# Patient Record
Sex: FEMALE | Race: OTHER | HISPANIC OR LATINO | URBAN - METROPOLITAN AREA
[De-identification: names, ages, dates, MRNs, and addresses within clinical notes are randomized per-mention and may not be internally consistent; named-entity substitution may affect disease eponyms.]

---

## 2022-07-12 ENCOUNTER — EMERGENCY (EMERGENCY)
Facility: HOSPITAL | Age: 62
LOS: 1 days | Discharge: HOME | End: 2022-07-12
Attending: STUDENT IN AN ORGANIZED HEALTH CARE EDUCATION/TRAINING PROGRAM
Payer: COMMERCIAL

## 2022-07-12 VITALS
SYSTOLIC BLOOD PRESSURE: 137 MMHG | DIASTOLIC BLOOD PRESSURE: 79 MMHG | HEART RATE: 85 BPM | OXYGEN SATURATION: 98 % | RESPIRATION RATE: 20 BRPM | HEIGHT: 59 IN | WEIGHT: 233.91 LBS | TEMPERATURE: 98 F

## 2022-07-12 PROCEDURE — 93010 ELECTROCARDIOGRAM REPORT: CPT | Mod: NC

## 2022-07-12 PROCEDURE — 99284 EMERGENCY DEPT VISIT MOD MDM: CPT | Mod: 25

## 2022-07-13 VITALS
SYSTOLIC BLOOD PRESSURE: 143 MMHG | OXYGEN SATURATION: 100 % | HEART RATE: 59 BPM | TEMPERATURE: 98 F | RESPIRATION RATE: 18 BRPM | DIASTOLIC BLOOD PRESSURE: 83 MMHG

## 2022-07-13 LAB
ALBUMIN SERPL ELPH-MCNC: 3.8 G/DL — SIGNIFICANT CHANGE UP (ref 3.3–5)
ALP SERPL-CCNC: 80 U/L — SIGNIFICANT CHANGE UP (ref 40–120)
ALT FLD-CCNC: 13 U/L — SIGNIFICANT CHANGE UP (ref 10–45)
ANION GAP SERPL CALC-SCNC: 13 MMOL/L — SIGNIFICANT CHANGE UP (ref 5–17)
AST SERPL-CCNC: 31 U/L — SIGNIFICANT CHANGE UP (ref 10–40)
BASOPHILS # BLD AUTO: 0 K/UL — SIGNIFICANT CHANGE UP (ref 0–0.2)
BASOPHILS NFR BLD AUTO: 0 % — SIGNIFICANT CHANGE UP (ref 0–2)
BILIRUB SERPL-MCNC: 0.3 MG/DL — SIGNIFICANT CHANGE UP (ref 0.2–1.2)
BUN SERPL-MCNC: 17 MG/DL — SIGNIFICANT CHANGE UP (ref 7–23)
CALCIUM SERPL-MCNC: 9 MG/DL — SIGNIFICANT CHANGE UP (ref 8.4–10.5)
CHLORIDE SERPL-SCNC: 104 MMOL/L — SIGNIFICANT CHANGE UP (ref 96–108)
CO2 SERPL-SCNC: 21 MMOL/L — LOW (ref 22–31)
CREAT SERPL-MCNC: 0.56 MG/DL — SIGNIFICANT CHANGE UP (ref 0.5–1.3)
EGFR: 102 ML/MIN/1.73M2 — SIGNIFICANT CHANGE UP
EOSINOPHIL # BLD AUTO: 0.22 K/UL — SIGNIFICANT CHANGE UP (ref 0–0.5)
EOSINOPHIL NFR BLD AUTO: 2.6 % — SIGNIFICANT CHANGE UP (ref 0–6)
GIANT PLATELETS BLD QL SMEAR: PRESENT — SIGNIFICANT CHANGE UP
GLUCOSE SERPL-MCNC: 93 MG/DL — SIGNIFICANT CHANGE UP (ref 70–99)
HCT VFR BLD CALC: 41.7 % — SIGNIFICANT CHANGE UP (ref 34.5–45)
HGB BLD-MCNC: 13 G/DL — SIGNIFICANT CHANGE UP (ref 11.5–15.5)
LYMPHOCYTES # BLD AUTO: 1.91 K/UL — SIGNIFICANT CHANGE UP (ref 1–3.3)
LYMPHOCYTES # BLD AUTO: 22.4 % — SIGNIFICANT CHANGE UP (ref 13–44)
MANUAL SMEAR VERIFICATION: SIGNIFICANT CHANGE UP
MCHC RBC-ENTMCNC: 22.3 PG — LOW (ref 27–34)
MCHC RBC-ENTMCNC: 31.2 GM/DL — LOW (ref 32–36)
MCV RBC AUTO: 71.5 FL — LOW (ref 80–100)
MONOCYTES # BLD AUTO: 0.73 K/UL — SIGNIFICANT CHANGE UP (ref 0–0.9)
MONOCYTES NFR BLD AUTO: 8.6 % — SIGNIFICANT CHANGE UP (ref 2–14)
NEUTROPHILS # BLD AUTO: 5.58 K/UL — SIGNIFICANT CHANGE UP (ref 1.8–7.4)
NEUTROPHILS NFR BLD AUTO: 64.6 % — SIGNIFICANT CHANGE UP (ref 43–77)
NEUTS BAND # BLD: 0.9 % — SIGNIFICANT CHANGE UP (ref 0–8)
PLAT MORPH BLD: NORMAL — SIGNIFICANT CHANGE UP
PLATELET # BLD AUTO: 256 K/UL — SIGNIFICANT CHANGE UP (ref 150–400)
POIKILOCYTOSIS BLD QL AUTO: SLIGHT — SIGNIFICANT CHANGE UP
POTASSIUM SERPL-MCNC: 5.3 MMOL/L — SIGNIFICANT CHANGE UP (ref 3.5–5.3)
POTASSIUM SERPL-SCNC: 5.3 MMOL/L — SIGNIFICANT CHANGE UP (ref 3.5–5.3)
PROT SERPL-MCNC: 8.1 G/DL — SIGNIFICANT CHANGE UP (ref 6–8.3)
RBC # BLD: 5.83 M/UL — HIGH (ref 3.8–5.2)
RBC # FLD: 16.1 % — HIGH (ref 10.3–14.5)
RBC BLD AUTO: SIGNIFICANT CHANGE UP
SARS-COV-2 RNA SPEC QL NAA+PROBE: SIGNIFICANT CHANGE UP
SODIUM SERPL-SCNC: 138 MMOL/L — SIGNIFICANT CHANGE UP (ref 135–145)
TROPONIN T, HIGH SENSITIVITY RESULT: <6 NG/L — SIGNIFICANT CHANGE UP (ref 0–51)
VARIANT LYMPHS # BLD: 0.9 % — SIGNIFICANT CHANGE UP (ref 0–6)
WBC # BLD: 8.52 K/UL — SIGNIFICANT CHANGE UP (ref 3.8–10.5)
WBC # FLD AUTO: 8.52 K/UL — SIGNIFICANT CHANGE UP (ref 3.8–10.5)

## 2022-07-13 PROCEDURE — 71045 X-RAY EXAM CHEST 1 VIEW: CPT | Mod: 26

## 2022-07-13 PROCEDURE — 80053 COMPREHEN METABOLIC PANEL: CPT

## 2022-07-13 PROCEDURE — 94640 AIRWAY INHALATION TREATMENT: CPT

## 2022-07-13 PROCEDURE — U0003: CPT

## 2022-07-13 PROCEDURE — 85025 COMPLETE CBC W/AUTO DIFF WBC: CPT

## 2022-07-13 PROCEDURE — U0005: CPT

## 2022-07-13 PROCEDURE — 84484 ASSAY OF TROPONIN QUANT: CPT

## 2022-07-13 PROCEDURE — 99285 EMERGENCY DEPT VISIT HI MDM: CPT | Mod: 25

## 2022-07-13 PROCEDURE — 71045 X-RAY EXAM CHEST 1 VIEW: CPT

## 2022-07-13 PROCEDURE — 93005 ELECTROCARDIOGRAM TRACING: CPT

## 2022-07-13 RX ORDER — IPRATROPIUM/ALBUTEROL SULFATE 18-103MCG
3 AEROSOL WITH ADAPTER (GRAM) INHALATION ONCE
Refills: 0 | Status: COMPLETED | OUTPATIENT
Start: 2022-07-13 | End: 2022-07-13

## 2022-07-13 RX ADMIN — Medication 3 MILLILITER(S): at 05:04

## 2022-07-13 NOTE — ED PROVIDER NOTE - NSFOLLOWUPCLINICS_GEN_ALL_ED_FT
[facility] Pulmonolgy and Sleep Medicine  Pulmonology  [address], [address]  [place] [address]  Phone: [phone]  Fax:     [facility] - Primary Care  Primary Care  [address] [place] [address]  Phone: [phone]  Fax:

## 2022-07-13 NOTE — ED PROVIDER NOTE - ATTENDING APP SHARED VISIT CONTRIBUTION OF CARE
I, Emma Murguia, performed a history and physical exam of the patient and discussed their management with the resident and /or advanced care provider. I reviewed the resident and /or ACP's note and agree with the documented findings and plan of care. I was present and available for all procedures.     pt with Hx of asthma, as needed albuterol inhaler at home, presenting with dry cough and SOB x 2-3 days. using inhaler at home without significant relief. reports intermittent severity of symptoms. no difficulty speaking, n/v. + chest tightness. speaking in full sentences, saturating well on RA, lungs CTAB, with dry cough on exam. likely viral infection causing mild asthma exacerbation. will give duonebs, labs, ekg and reassess.

## 2022-07-13 NOTE — ED ADULT NURSE NOTE - OBJECTIVE STATEMENT
62 y/o female with PMH of asthma arrives to the ER ambulatory  complaining of SOB. Pt is A&Ox4, speaking coherently,  pt reports worsening shortness of breath since Sunday, BROOKS and chest tightness today. On assessment airway is patent, breathing spontaneously and unlabored. Skin is dry, warm and color appropriate to race.  Abdomen is soft, no distended, no tender. Full ROM in all extremities.  Patient undressed and placed into gown, side rails up with bed locked and in lowest position for safety. call bell within reach. [place] provided. Comfort and safety provided. will continue to reassess. 60 y/o female with PMH of asthma arrives to the ER ambulatory complaining of SOB. Pt is A&Ox4, speaking coherently,  pt reports worsening shortness of breath, dry cough since Sunday, BROOKS and chest tightness today with associated nausea. EKG performed, blood work sent to the lab. On assessment airway is patent, breathing spontaneously and unlabored, able to speak full sentences Skin is dry, warm and color appropriate to race. Abdomen is soft, no distended, no tender. Full ROM in all extremities. Comfort and safety provided, side rails up with bed locked and in lowest position for safety. call bell within reach. [place] provided.  will continue to reassess.

## 2022-07-13 NOTE — ED PROVIDER NOTE - PATIENT PORTAL LINK FT
You can access the FollowMyHealth Patient Portal offered by [facility] by registering at the following website: [email or web address]. By joining [employer]’s FollowMyHealth portal, you will also be able to view your health information using other applications (apps) compatible with our system.

## 2022-07-13 NOTE — ED PROVIDER NOTE - PROGRESS NOTE DETAILS
labs and imaging reviewed with patient  patient reports improvement of symptoms  symptoms likely due to mild asthma exacerbation   will send prednisone to pharmacy   advised f/u with PCP   strict return precautions discussed   stable for discharge  discussed with Dr. Mcguire

## 2022-07-13 NOTE — ED PROVIDER NOTE - NS ED ATTENDING STATEMENT MOD
This was a shared visit with the LICHA. I reviewed and verified the documentation and independently performed the documented:

## 2022-07-13 NOTE — ED PROVIDER NOTE - OBJECTIVE STATEMENT
60 y/o female, hx of asthma, presents to the ER with complaint of cough, chest tightness and SOB x few days. states her cough is dry. states has an inhaler at home that she used but did not help. denies difficulty speaking. denies f/n/v/d, CP, LOC, numbness, tingling, dizziness, HA, abdominal pain, urinary symptoms.

## 2022-07-13 NOTE — ED PROVIDER NOTE - NSFOLLOWUPINSTRUCTIONS_ED_ALL_ED_FT
you were seen in the ER today for cough and Shortness of breath   your symptoms are likely due to your asthma acting up   it is important you follow up with your primary care doctor regarding today's visit    your medication from your pharmacy and take as instructed  if your symptoms worsen, persist, or if any new symptoms develop, or if you experience any signs of distress, return to the ER as soon as possible

## 2022-07-13 NOTE — ED PROVIDER NOTE - RESPIRATORY, MLM
Breath sounds clear and equal bilaterally. speaking in full sentences. no use of accessory muscles. no tripoding.

## 2023-07-15 ENCOUNTER — EMERGENCY (EMERGENCY)
Facility: HOSPITAL | Age: 63
LOS: 1 days | Discharge: HOME | End: 2023-07-15
Attending: EMERGENCY MEDICINE
Payer: COMMERCIAL

## 2023-07-15 VITALS
WEIGHT: 240.08 LBS | SYSTOLIC BLOOD PRESSURE: 120 MMHG | DIASTOLIC BLOOD PRESSURE: 52 MMHG | HEART RATE: 103 BPM | HEIGHT: 59 IN | TEMPERATURE: 98 F | RESPIRATION RATE: 18 BRPM | OXYGEN SATURATION: 95 %

## 2023-07-15 PROCEDURE — 99285 EMERGENCY DEPT VISIT HI MDM: CPT

## 2023-07-16 ENCOUNTER — INPATIENT (INPATIENT)
Facility: HOSPITAL | Age: 63
LOS: 2 days | Discharge: HOME | DRG: 193 | End: 2023-07-19
Attending: INTERNAL MEDICINE | Admitting: INTERNAL MEDICINE
Payer: COMMERCIAL

## 2023-07-16 VITALS
SYSTOLIC BLOOD PRESSURE: 126 MMHG | TEMPERATURE: 98 F | OXYGEN SATURATION: 96 % | DIASTOLIC BLOOD PRESSURE: 78 MMHG | RESPIRATION RATE: 18 BRPM | HEART RATE: 78 BPM

## 2023-07-16 VITALS
OXYGEN SATURATION: 95 % | WEIGHT: 240.08 LBS | HEIGHT: 59 IN | SYSTOLIC BLOOD PRESSURE: 143 MMHG | DIASTOLIC BLOOD PRESSURE: 83 MMHG | TEMPERATURE: 98 F | RESPIRATION RATE: 18 BRPM | HEART RATE: 96 BPM

## 2023-07-16 DIAGNOSIS — Z98.51 TUBAL LIGATION STATUS: Chronic | ICD-10-CM

## 2023-07-16 DIAGNOSIS — J96.01 ACUTE RESPIRATORY FAILURE WITH HYPOXIA: ICD-10-CM

## 2023-07-16 DIAGNOSIS — Z29.9 ENCOUNTER FOR PROPHYLACTIC MEASURES, UNSPECIFIED: ICD-10-CM

## 2023-07-16 DIAGNOSIS — R09.89 OTHER SPECIFIED SYMPTOMS AND SIGNS INVOLVING THE CIRCULATORY AND RESPIRATORY SYSTEMS: ICD-10-CM

## 2023-07-16 DIAGNOSIS — R73.9 HYPERGLYCEMIA, UNSPECIFIED: ICD-10-CM

## 2023-07-16 DIAGNOSIS — E66.01 MORBID (SEVERE) OBESITY DUE TO EXCESS CALORIES: ICD-10-CM

## 2023-07-16 DIAGNOSIS — R06.09 OTHER FORMS OF DYSPNEA: ICD-10-CM

## 2023-07-16 DIAGNOSIS — Z90.49 ACQUIRED ABSENCE OF OTHER SPECIFIED PARTS OF DIGESTIVE TRACT: Chronic | ICD-10-CM

## 2023-07-16 LAB
ALBUMIN SERPL ELPH-MCNC: 3.3 G/DL — LOW (ref 3.5–5)
ALBUMIN SERPL ELPH-MCNC: 3.4 G/DL — LOW (ref 3.5–5)
ALP SERPL-CCNC: 77 U/L — SIGNIFICANT CHANGE UP (ref 40–120)
ALP SERPL-CCNC: 77 U/L — SIGNIFICANT CHANGE UP (ref 40–120)
ALT FLD-CCNC: 22 U/L DA — SIGNIFICANT CHANGE UP (ref 10–60)
ALT FLD-CCNC: 23 U/L DA — SIGNIFICANT CHANGE UP (ref 10–60)
ANION GAP SERPL CALC-SCNC: 8 MMOL/L — SIGNIFICANT CHANGE UP (ref 5–17)
ANION GAP SERPL CALC-SCNC: 8 MMOL/L — SIGNIFICANT CHANGE UP (ref 5–17)
APTT BLD: 27.2 SEC — LOW (ref 27.5–35.5)
AST SERPL-CCNC: 13 U/L — SIGNIFICANT CHANGE UP (ref 10–40)
AST SERPL-CCNC: 9 U/L — LOW (ref 10–40)
BASE EXCESS BLDV CALC-SCNC: 3.9 MMOL/L — SIGNIFICANT CHANGE UP
BASOPHILS # BLD AUTO: 0.01 K/UL — SIGNIFICANT CHANGE UP (ref 0–0.2)
BASOPHILS # BLD AUTO: 0.03 K/UL — SIGNIFICANT CHANGE UP (ref 0–0.2)
BASOPHILS NFR BLD AUTO: 0.1 % — SIGNIFICANT CHANGE UP (ref 0–2)
BASOPHILS NFR BLD AUTO: 0.3 % — SIGNIFICANT CHANGE UP (ref 0–2)
BILIRUB SERPL-MCNC: 0.2 MG/DL — SIGNIFICANT CHANGE UP (ref 0.2–1.2)
BILIRUB SERPL-MCNC: 0.4 MG/DL — SIGNIFICANT CHANGE UP (ref 0.2–1.2)
BUN SERPL-MCNC: 14 MG/DL — SIGNIFICANT CHANGE UP (ref 7–18)
BUN SERPL-MCNC: 15 MG/DL — SIGNIFICANT CHANGE UP (ref 7–18)
CALCIUM SERPL-MCNC: 8.5 MG/DL — SIGNIFICANT CHANGE UP (ref 8.4–10.5)
CALCIUM SERPL-MCNC: 8.6 MG/DL — SIGNIFICANT CHANGE UP (ref 8.4–10.5)
CHLORIDE SERPL-SCNC: 111 MMOL/L — HIGH (ref 96–108)
CHLORIDE SERPL-SCNC: 112 MMOL/L — HIGH (ref 96–108)
CO2 SERPL-SCNC: 22 MMOL/L — SIGNIFICANT CHANGE UP (ref 22–31)
CO2 SERPL-SCNC: 23 MMOL/L — SIGNIFICANT CHANGE UP (ref 22–31)
CREAT SERPL-MCNC: 0.54 MG/DL — SIGNIFICANT CHANGE UP (ref 0.5–1.3)
CREAT SERPL-MCNC: 0.72 MG/DL — SIGNIFICANT CHANGE UP (ref 0.5–1.3)
EGFR: 103 ML/MIN/1.73M2 — SIGNIFICANT CHANGE UP
EGFR: 93 ML/MIN/1.73M2 — SIGNIFICANT CHANGE UP
EOSINOPHIL # BLD AUTO: 0 K/UL — SIGNIFICANT CHANGE UP (ref 0–0.5)
EOSINOPHIL # BLD AUTO: 0 K/UL — SIGNIFICANT CHANGE UP (ref 0–0.5)
EOSINOPHIL NFR BLD AUTO: 0 % — SIGNIFICANT CHANGE UP (ref 0–6)
EOSINOPHIL NFR BLD AUTO: 0 % — SIGNIFICANT CHANGE UP (ref 0–6)
GLUCOSE SERPL-MCNC: 113 MG/DL — HIGH (ref 70–99)
GLUCOSE SERPL-MCNC: 187 MG/DL — HIGH (ref 70–99)
HCO3 BLDV-SCNC: 28 MMOL/L — SIGNIFICANT CHANGE UP (ref 22–29)
HCT VFR BLD CALC: 38.6 % — SIGNIFICANT CHANGE UP (ref 34.5–45)
HCT VFR BLD CALC: 40.5 % — SIGNIFICANT CHANGE UP (ref 34.5–45)
HGB BLD-MCNC: 12.4 G/DL — SIGNIFICANT CHANGE UP (ref 11.5–15.5)
HGB BLD-MCNC: 12.8 G/DL — SIGNIFICANT CHANGE UP (ref 11.5–15.5)
IMM GRANULOCYTES NFR BLD AUTO: 0.8 % — SIGNIFICANT CHANGE UP (ref 0–0.9)
IMM GRANULOCYTES NFR BLD AUTO: 0.9 % — SIGNIFICANT CHANGE UP (ref 0–0.9)
INR BLD: 1.01 RATIO — SIGNIFICANT CHANGE UP (ref 0.88–1.16)
LYMPHOCYTES # BLD AUTO: 0.71 K/UL — LOW (ref 1–3.3)
LYMPHOCYTES # BLD AUTO: 0.82 K/UL — LOW (ref 1–3.3)
LYMPHOCYTES # BLD AUTO: 7.5 % — LOW (ref 13–44)
LYMPHOCYTES # BLD AUTO: 7.9 % — LOW (ref 13–44)
MCHC RBC-ENTMCNC: 22.6 PG — LOW (ref 27–34)
MCHC RBC-ENTMCNC: 23 PG — LOW (ref 27–34)
MCHC RBC-ENTMCNC: 31.6 GM/DL — LOW (ref 32–36)
MCHC RBC-ENTMCNC: 32.1 GM/DL — SIGNIFICANT CHANGE UP (ref 32–36)
MCV RBC AUTO: 71.4 FL — LOW (ref 80–100)
MCV RBC AUTO: 71.7 FL — LOW (ref 80–100)
MONOCYTES # BLD AUTO: 0.47 K/UL — SIGNIFICANT CHANGE UP (ref 0–0.9)
MONOCYTES # BLD AUTO: 0.61 K/UL — SIGNIFICANT CHANGE UP (ref 0–0.9)
MONOCYTES NFR BLD AUTO: 5 % — SIGNIFICANT CHANGE UP (ref 2–14)
MONOCYTES NFR BLD AUTO: 5.9 % — SIGNIFICANT CHANGE UP (ref 2–14)
NEUTROPHILS # BLD AUTO: 8.19 K/UL — HIGH (ref 1.8–7.4)
NEUTROPHILS # BLD AUTO: 8.81 K/UL — HIGH (ref 1.8–7.4)
NEUTROPHILS NFR BLD AUTO: 85.2 % — HIGH (ref 43–77)
NEUTROPHILS NFR BLD AUTO: 86.4 % — HIGH (ref 43–77)
NRBC # BLD: 0 /100 WBCS — SIGNIFICANT CHANGE UP (ref 0–0)
NRBC # BLD: 0 /100 WBCS — SIGNIFICANT CHANGE UP (ref 0–0)
NT-PROBNP SERPL-SCNC: 260 PG/ML — HIGH (ref 0–125)
PCO2 BLDV: 38 MMHG — LOW (ref 39–42)
PH BLDV: 7.47 — HIGH (ref 7.32–7.43)
PLATELET # BLD AUTO: 284 K/UL — SIGNIFICANT CHANGE UP (ref 150–400)
PLATELET # BLD AUTO: 285 K/UL — SIGNIFICANT CHANGE UP (ref 150–400)
PO2 BLDV: 49 MMHG — SIGNIFICANT CHANGE UP
POTASSIUM SERPL-MCNC: 4.1 MMOL/L — SIGNIFICANT CHANGE UP (ref 3.5–5.3)
POTASSIUM SERPL-MCNC: 4.5 MMOL/L — SIGNIFICANT CHANGE UP (ref 3.5–5.3)
POTASSIUM SERPL-SCNC: 4.1 MMOL/L — SIGNIFICANT CHANGE UP (ref 3.5–5.3)
POTASSIUM SERPL-SCNC: 4.5 MMOL/L — SIGNIFICANT CHANGE UP (ref 3.5–5.3)
PROT SERPL-MCNC: 7.7 G/DL — SIGNIFICANT CHANGE UP (ref 6–8.3)
PROT SERPL-MCNC: 8 G/DL — SIGNIFICANT CHANGE UP (ref 6–8.3)
PROTHROM AB SERPL-ACNC: 12 SEC — SIGNIFICANT CHANGE UP (ref 10.5–13.4)
RAPID RVP RESULT: DETECTED
RBC # BLD: 5.38 M/UL — HIGH (ref 3.8–5.2)
RBC # BLD: 5.67 M/UL — HIGH (ref 3.8–5.2)
RBC # FLD: 15.8 % — HIGH (ref 10.3–14.5)
RBC # FLD: 15.9 % — HIGH (ref 10.3–14.5)
RSV RNA SPEC QL NAA+PROBE: DETECTED
SAO2 % BLDV: 79 % — SIGNIFICANT CHANGE UP
SARS-COV-2 RNA SPEC QL NAA+PROBE: SIGNIFICANT CHANGE UP
SODIUM SERPL-SCNC: 142 MMOL/L — SIGNIFICANT CHANGE UP (ref 135–145)
SODIUM SERPL-SCNC: 142 MMOL/L — SIGNIFICANT CHANGE UP (ref 135–145)
TROPONIN I, HIGH SENSITIVITY RESULT: 15.1 NG/L — SIGNIFICANT CHANGE UP
TROPONIN I, HIGH SENSITIVITY RESULT: 8.8 NG/L — SIGNIFICANT CHANGE UP
WBC # BLD: 10.34 K/UL — SIGNIFICANT CHANGE UP (ref 3.8–10.5)
WBC # BLD: 9.48 K/UL — SIGNIFICANT CHANGE UP (ref 3.8–10.5)
WBC # FLD AUTO: 10.34 K/UL — SIGNIFICANT CHANGE UP (ref 3.8–10.5)
WBC # FLD AUTO: 9.48 K/UL — SIGNIFICANT CHANGE UP (ref 3.8–10.5)

## 2023-07-16 PROCEDURE — 71045 X-RAY EXAM CHEST 1 VIEW: CPT | Mod: 26,59

## 2023-07-16 PROCEDURE — 99285 EMERGENCY DEPT VISIT HI MDM: CPT

## 2023-07-16 PROCEDURE — 94640 AIRWAY INHALATION TREATMENT: CPT

## 2023-07-16 PROCEDURE — 99285 EMERGENCY DEPT VISIT HI MDM: CPT | Mod: 25

## 2023-07-16 PROCEDURE — 96365 THER/PROPH/DIAG IV INF INIT: CPT

## 2023-07-16 PROCEDURE — 71046 X-RAY EXAM CHEST 2 VIEWS: CPT | Mod: 26

## 2023-07-16 PROCEDURE — 96375 TX/PRO/DX INJ NEW DRUG ADDON: CPT

## 2023-07-16 PROCEDURE — 71275 CT ANGIOGRAPHY CHEST: CPT | Mod: 26,MA

## 2023-07-16 RX ORDER — ENOXAPARIN SODIUM 100 MG/ML
40 INJECTION SUBCUTANEOUS EVERY 24 HOURS
Refills: 0 | Status: DISCONTINUED | OUTPATIENT
Start: 2023-07-16 | End: 2023-07-19

## 2023-07-16 RX ORDER — CEFTRIAXONE 500 MG/1
1000 INJECTION, POWDER, FOR SOLUTION INTRAMUSCULAR; INTRAVENOUS EVERY 24 HOURS
Refills: 0 | Status: DISCONTINUED | OUTPATIENT
Start: 2023-07-17 | End: 2023-07-19

## 2023-07-16 RX ORDER — MAGNESIUM SULFATE 500 MG/ML
2 VIAL (ML) INJECTION ONCE
Refills: 0 | Status: COMPLETED | OUTPATIENT
Start: 2023-07-16 | End: 2023-07-16

## 2023-07-16 RX ORDER — IPRATROPIUM/ALBUTEROL SULFATE 18-103MCG
3 AEROSOL WITH ADAPTER (GRAM) INHALATION
Refills: 0 | Status: COMPLETED | OUTPATIENT
Start: 2023-07-16 | End: 2023-07-16

## 2023-07-16 RX ORDER — AZITHROMYCIN 500 MG/1
500 TABLET, FILM COATED ORAL ONCE
Refills: 0 | Status: COMPLETED | OUTPATIENT
Start: 2023-07-16 | End: 2023-07-16

## 2023-07-16 RX ORDER — AZITHROMYCIN 500 MG/1
1 TABLET, FILM COATED ORAL
Qty: 6 | Refills: 0
Start: 2023-07-16 | End: 2023-07-20

## 2023-07-16 RX ORDER — CEFTRIAXONE 500 MG/1
1000 INJECTION, POWDER, FOR SOLUTION INTRAMUSCULAR; INTRAVENOUS ONCE
Refills: 0 | Status: COMPLETED | OUTPATIENT
Start: 2023-07-16 | End: 2023-07-16

## 2023-07-16 RX ORDER — CEFTRIAXONE 500 MG/1
INJECTION, POWDER, FOR SOLUTION INTRAMUSCULAR; INTRAVENOUS
Refills: 0 | Status: DISCONTINUED | OUTPATIENT
Start: 2023-07-16 | End: 2023-07-19

## 2023-07-16 RX ORDER — AZITHROMYCIN 500 MG/1
TABLET, FILM COATED ORAL
Refills: 0 | Status: DISCONTINUED | OUTPATIENT
Start: 2023-07-16 | End: 2023-07-19

## 2023-07-16 RX ORDER — AZITHROMYCIN 500 MG/1
500 TABLET, FILM COATED ORAL EVERY 24 HOURS
Refills: 0 | Status: DISCONTINUED | OUTPATIENT
Start: 2023-07-17 | End: 2023-07-19

## 2023-07-16 RX ORDER — IPRATROPIUM/ALBUTEROL SULFATE 18-103MCG
3 AEROSOL WITH ADAPTER (GRAM) INHALATION EVERY 6 HOURS
Refills: 0 | Status: DISCONTINUED | OUTPATIENT
Start: 2023-07-16 | End: 2023-07-19

## 2023-07-16 RX ADMIN — Medication 3 MILLILITER(S): at 23:15

## 2023-07-16 RX ADMIN — Medication 3 MILLILITER(S): at 00:49

## 2023-07-16 RX ADMIN — AZITHROMYCIN 255 MILLIGRAM(S): 500 TABLET, FILM COATED ORAL at 20:03

## 2023-07-16 RX ADMIN — Medication 2 GRAM(S): at 01:10

## 2023-07-16 RX ADMIN — Medication 3 MILLILITER(S): at 01:51

## 2023-07-16 RX ADMIN — Medication 3 MILLILITER(S): at 01:21

## 2023-07-16 RX ADMIN — Medication 125 MILLIGRAM(S): at 15:20

## 2023-07-16 RX ADMIN — Medication 150 GRAM(S): at 00:49

## 2023-07-16 RX ADMIN — Medication 125 MILLIGRAM(S): at 00:49

## 2023-07-16 RX ADMIN — CEFTRIAXONE 1000 MILLIGRAM(S): 500 INJECTION, POWDER, FOR SOLUTION INTRAMUSCULAR; INTRAVENOUS at 17:46

## 2023-07-16 NOTE — ED PROVIDER NOTE - CARE PLAN
1 Principal Discharge DX:	BROOKS (dyspnea on exertion)   Principal Discharge DX:	BROOKS (dyspnea on exertion)  Secondary Diagnosis:	Acute respiratory failure with hypoxia

## 2023-07-16 NOTE — ED PROVIDER NOTE - CLINICAL SUMMARY MEDICAL DECISION MAKING FREE TEXT BOX
Patient with shortness of breath worse with any exertion.  We will do labs x-ray nebulizer treatment admit for cardiac work-up. Patient with shortness of breath worse with any exertion.  We will do labs x-ray nebulizer treatment admit for cardiac work-up. O2 sat 92-91 on room air. will order CT angio to evaluation for PE. admit to tele for BROOKS as anginal equivalent

## 2023-07-16 NOTE — ED PROVIDER NOTE - OBJECTIVE STATEMENT
62-year-old female history of asthma presents to ED with shortness of breath for the past 2 days.  Patient went to an urgent care yesterday for same was given a nebulizer treatment prescription for steroids and discharged home.  As per patient she had a viral swab done which was negative.  Patient states she returned home however the cough and shortness of breath continued.  Cough nonproductive as per patient.

## 2023-07-16 NOTE — ED PROVIDER NOTE - OBJECTIVE STATEMENT
Patient presents with shortness of breath.  Patient's daughter notes that for the last several months has been having shortness of breath that is getting worse.  Shortness of breath becomes increasingly acute during attempted exertion.  Patient was in the ER 12 hours ago had x-rays and labs and was diagnosed with maybe mild pneumonia and was discharged home.  However patient returns still feeling very short of breath.  Patient does not really have a PCP has not really followed up so has no known medical problems.  Daughter notes that perhaps the ankles are more swollen than normal.  Notes occasional chest pain no chest pain currently.

## 2023-07-16 NOTE — H&P ADULT - ATTENDING COMMENTS
Pt appearing to have clinical s/s of CAP in addition to parameters for pickwickian syndrome making her breathing a task.  Pt is being closely monitored in case of CPAP support if need be.

## 2023-07-16 NOTE — H&P ADULT - NSHPPHYSICALEXAM_GEN_ALL_CORE
GENERAL: Mild resp distress  EYES: EOMI, PERRLA,   NECK: Supple, No JVD  CHEST/LUNG: Wheezing L>R  HEART: Regular rate and rhythm; No murmurs, +ve S1 S2   ABDOMEN: Soft, Nontender, distended;  NERVOUS SYSTEM:  Alert & Oriented X3, no focal neurologic deficit   EXTREMITIES:   No clubbing, cyanosis, +ve lymphedema B/L

## 2023-07-16 NOTE — ED ADULT NURSE REASSESSMENT NOTE - NS ED NURSE REASSESS COMMENT FT1
Pt received from KAYLEE Castelan, calm, no s/s of any distress noted. IV line in place, IV fluids infusing as per orders, no signs of infiltration noted. VS WNL. Call bell in reach, bed in lowest position. Pt on cardiac monitor, no distress noted.

## 2023-07-16 NOTE — ED PROVIDER NOTE - PROGRESS NOTE DETAILS
pt reassessed and reports feeling better.  pt has steroid prescription given by urgent care yesterday.  Will dc with abx for bronchitis

## 2023-07-16 NOTE — ED ADULT NURSE NOTE - NSFALLUNIVINTERV_ED_ALL_ED
Bed/Stretcher in lowest position, wheels locked, appropriate side rails in place/Call bell, personal items and telephone in reach/Instruct patient to call for assistance before getting out of bed/chair/stretcher/Non-slip footwear applied when patient is off stretcher/[place] to call system/Physically safe environment - no spills, clutter or unnecessary equipment/Purposeful proactive rounding/Room/bathroom lighting operational, light cord in reach

## 2023-07-16 NOTE — H&P ADULT - NSHPREVIEWOFSYSTEMS_GEN_ALL_CORE
REVIEW OF SYSTEMS      General:	    Respiratory and Thorax: denied , +ve SOB on exertion  	  Cardiovascular: chest pain on coughing , +ve SOB on exertion    Gastrointestinal: denied abdominal pain     Musculoskeletal: denied body aches    Neurological: denied headaches, blurry vision

## 2023-07-16 NOTE — ED PROVIDER NOTE - PROGRESS NOTE DETAILS
Patient had an episode of acute respiratory distress after returning from x-ray.  Patient states that when she had to get up for x-ray she became acutely short of breath and was brought back from x-ray gasping diaphoretic and a little bit cyanotic.  Patient was allowed to stand up from sitting because body habitus appeared to be impeding the respiratory ability.  Patient felt better when sitting up with the legs hanging down off the stretcher patient was placed on nonrebreather and then received nebulizer treatment.  O2 sat 100%.

## 2023-07-16 NOTE — H&P ADULT - PROBLEM SELECTOR PLAN 1
-Pt came with BROOKS and productive cough  -DDx: Viral/Bacterial pneumonia vs ?PE , any cardiac disease and obesity can be contributing factor   -Tropx2 neg , ProBNP <300 but could be falsely low in obese pt   -Xray chest with left lung atelectasis   -f/u CTA chest for PE  -VBG : PH 7.47/38  -Pt has no leukocytosis , afebrile, HR 96 on admission, but she was on abx recently   -Will start on Ceftriaxone and azithromycin to treat bacterial pneumonia  -Duoneb q6hr for wheezing  -ECHO and Tele monitor for 24 hours if no event and to rule out any arrhythmias -Pt came with BROOKS and productive cough  -DDx: Viral/Bacterial pneumonia vs ?PE , any cardiac disease and obesity can be contributing factor   -Tropx2 neg , ProBNP <300 but could be falsely low in obese pt   -Xray chest with left lung atelectasis   -f/u CTA chest for PE/or other pathology  -VBG : PH 7.47/38  -Pt has no leukocytosis , afebrile, HR 96 on admission, but she was on abx recently   -Will start on Ceftriaxone and azithromycin to treat bacterial pneumonia  -Duoneb q6hr for wheezing  -ECHO   -Tele monitor for 24 hours to rule out any arrhythmias if no event can dc tele  -Ordered sputum culture , f/u procalcitonin in am -Pt came with BROOKS and productive cough  -DDx: Viral/Bacterial pneumonia vs ?PE ,obesity can be a contributing factor   -Tropx2 neg , ProBNP <300, ekg with no ischemic changes or arrhythmias   -Xray chest with left lung atelectasis   -f/u CTA chest for PE/or other pathology  -VBG : PH 7.47/38  -Pt has no leukocytosis , afebrile, HR 96 on admission, but she was on abx recently   -Will start on Ceftriaxone and azithromycin to treat bacterial pneumonia  -Duoneb q6hr for wheezing  -Ordered sputum culture , f/u procalcitonin in am  -f/u RVP/COVID  -f/u bcx   -f/u UA -Pt came with BROOKS and productive cough  -Pt noted to be 90% on RA with tachypnea, placed on 2 lit with O2 saturation 96-07%  -DDx: Viral/Bacterial pneumonia vs ?PE ,obesity can be a contributing factor   -Tropx2 neg , ProBNP <300, ekg with no ischemic changes or arrhythmias   -Xray chest with left lung atelectasis   -f/u CTA chest for PE/or other pathology  -VBG : PH 7.47/38  -Pt has no leukocytosis , afebrile, HR 96 on admission, but she was on abx recently   -Will start on Ceftriaxone and azithromycin to treat bacterial pneumonia  -Duoneb q6hr for wheezing  -Ordered sputum culture , f/u procalcitonin in am  -f/u RVP/COVID  -f/u bcx   -f/u UA

## 2023-07-16 NOTE — ED ADULT NURSE NOTE - NS ED NURSE PATIENT LEFT UNIT TIME
0000- TRANSFER - IN REPORT:    Verbal report received from [facility], RN(name) on Brenda 60  being received from NICU(unit) for routine progression of care      Report consisted of patients Situation, Background, Assessment and   Recommendations(SBAR). Information from the following report(s) SBAR, Kardex, Intake/Output, MAR and Recent Results was reviewed with the receiving nurse. Opportunity for questions and clarification was provided. Assessment completed upon patients arrival to unit and care assumed. 0715- Bedside shift change report given to JASON Quiros RN (oncoming nurse) by She Murray RN (offgoing nurse). Report included the following information SBAR, Kardex, Intake/Output, MAR, Recent Results and Quality Measures. 17:50

## 2023-07-16 NOTE — PATIENT PROFILE ADULT - STATED REASON FOR ADMISSION
Provider response relayed via portal message. Awaiting reply from patient.     DIFFICULTIES BREATHING

## 2023-07-16 NOTE — ED PROVIDER NOTE - CLINICAL SUMMARY MEDICAL DECISION MAKING FREE TEXT BOX
62-year-old female history of asthma presents to ED with shortness of breath.  Here in ED patient with wheezing diffusely.  Speaking full sentences.  Likely asthma exacerbation.  Will check chest x-ray rule out pneumonia nebs steroids magnesium reassess

## 2023-07-16 NOTE — ED PROVIDER NOTE - NSFOLLOWUPINSTRUCTIONS_ED_ALL_ED_FT
Acute Bronchitis, Adult  A comparison between normal and swollen airways, or bronchi, in the lungs.  Acute bronchitis is sudden inflammation of the main airways (bronchi) that come off the windpipe (trachea) in the lungs. The swelling causes the airways to get smaller and make more mucus than normal. This can make it hard to breathe and can cause coughing or noisy breathing (wheezing).    Acute bronchitis may last several weeks. The cough may last longer. Allergies, asthma, and exposure to smoke may make the condition worse.    What are the causes?  This condition can be caused by germs and by substances that irritate the lungs, including:  Cold and flu viruses. The most common cause of this condition is the virus that causes the common cold.  Bacteria. This is less common.  Breathing in substances that irritate the lungs, including:  Smoke from cigarettes and other forms of tobacco.  Dust and pollen.  Fumes from household cleaning products, gases, or burned fuel.  Indoor or outdoor air pollution.  What increases the risk?  The following factors may make you more likely to develop this condition:  A weak body's defense system, also called the immune system.  A condition that affects your lungs and breathing, such as asthma.  What are the signs or symptoms?  Common symptoms of this condition include:  Coughing. This may bring up clear, yellow, or green mucus from your lungs (sputum).  Wheezing.  Runny or stuffy nose.  Having too much mucus in your lungs (chest congestion).  Shortness of breath.  Aches and pains, including sore throat or chest.  How is this diagnosed?  This condition is usually diagnosed based on:  Your symptoms and medical history.  A physical exam.  You may also have other tests, including tests to rule out other conditions, such as pneumonia. These tests include:  A test of lung function.  Test of a mucus sample to look for the presence of bacteria.  Tests to check the oxygen level in your blood.  Blood tests.  Chest X-ray.  How is this treated?  Most cases of acute bronchitis clear up over time without treatment. Your health care provider may recommend:  Drinking more fluids to help thin your mucus so it is easier to cough up.  Taking inhaled medicine (inhaler) to improve air flow in and out of your lungs.  Using a vaporizer or a humidifier. These are machines that add water to the air to help you breathe better.  Taking a medicine that thins mucus and clears congestion (expectorant).  Taking a medicine that prevents or stops coughing (cough suppressant).  It is not common to take an antibiotic medicine for this condition.    Follow these instructions at home:  A do not smoke cigarettes sign.  Take over-the-counter and prescription medicines only as told by your health care provider.  Use an inhaler, vaporizer, or humidifier as told by your health care provider.  Take two teaspoons (10 mL) of honey at bedtime to lessen coughing at night.  Drink enough fluid to keep your urine pale yellow.  Do not use any products that contain nicotine or tobacco. These products include cigarettes, chewing tobacco, and vaping devices, such as e-cigarettes. If you need help quitting, ask your health care provider.  Get plenty of rest.  Return to your normal activities as told by your health care provider. Ask your health care provider what activities are safe for you.  Keep all follow-up visits. This is important.  How is this prevented?  Washing hands with soap and water.  To lower your risk of getting this condition again:  Wash your hands often with soap and water for at least 20 seconds. If soap and water are not available, use hand .  Avoid contact with people who have cold symptoms.  Try not to touch your mouth, nose, or eyes with your hands.  Avoid breathing in smoke or chemical fumes. Breathing smoke or chemical fumes will make your condition worse.  Get the flu shot every year.  Contact a health care provider if:  Your symptoms do not improve after 2 weeks.  You have trouble coughing up the mucus.  Your cough keeps you awake at night.  You have a fever.  Get help right away if you:  Cough up blood.  Feel pain in your chest.  Have severe shortness of breath.  Faint or keep feeling like you are going to faint.  Have a severe headache.  Have a fever or chills that get worse.  These symptoms may represent a serious problem that is an emergency. Do not wait to see if the symptoms will go away. Get medical help right away. Call your local emergency services (911 in the [place] Do not drive yourself to the hospital.    Summary  Acute bronchitis is inflammation of the main airways (bronchi) that come off the windpipe (trachea) in the lungs. The swelling causes the airways to get smaller and make more mucus than normal.  Drinking more fluids can help thin your mucus so it is easier to cough up.  Take over-the-counter and prescription medicines only as told by your health care provider.  Do not use any products that contain nicotine or tobacco. These products include cigarettes, chewing tobacco, and vaping devices, such as e-cigarettes. If you need help quitting, ask your health care provider.  Contact a health care provider if your symptoms do not improve after 2 weeks.  This information is not intended to replace advice given to you by your health care provider. Make sure you discuss any questions you have with your health care provider.    Document Revised: 03/30/2023 Document Reviewed: 04/20/2022

## 2023-07-16 NOTE — ED PROVIDER NOTE - PATIENT PORTAL LINK FT
You can access the FollowMyHealth Patient Portal offered by [facility] by registering at the following website: [email or web address]. By joining [employer]’s FollowMyHealth portal, you will also be able to view your health information using other applications (apps) compatible with our system.

## 2023-07-16 NOTE — ED PROVIDER NOTE - PHYSICAL EXAMINATION
Heart regular trace expiratory wheeze.  Patient's body habitus limits quality physical exam.  Awake alert not in any distress.  Nonpitting edema in lower legs.

## 2023-07-16 NOTE — PATIENT PROFILE ADULT - FALL HARM RISK - HARM RISK INTERVENTIONS

## 2023-07-16 NOTE — ED ADULT NURSE NOTE - OBJECTIVE STATEMENT
pt awake and oriented x 4. c/o sob. pt known asthma. pt dyspnea when moving. pt sitting on the bed. ambulatory

## 2023-07-16 NOTE — H&P ADULT - HISTORY OF PRESENT ILLNESS
61 yo F, from home, ambulates independently, no known PMH/PSH, has not seen doctor in years, came with chief complain of cough and shortness of breadth. Pt stated that she has been coughing for 2 months first it was dry but for 2 days she is bringing up mucus white in color. Pt has had BROOKS for two months which she was able to manage but for two days it is getting worse and she can't even walk few steps without getting short of breath. Pt has been to urgent care multiple time in past two months and was prescribed prednisione and albuterol. As her symptoms got worse in two days she came to [facility] ED this morning and was discharged on advil, azithromycina dn prednisone but pt said as soon as she got home she was so SOB that unable to do any activity which prompted her to come to ED. Pt deneid sick contacts, chest pain +ve only when she ocughs, denied hemoptysis, orthopnea, dyspnea at rest.     ED: s/p IV solumedrol  Xray chest showed Present left shows slight linear atelectasis in left mid lateral lung  CTA showed

## 2023-07-16 NOTE — H&P ADULT - ASSESSMENT
63 yo F, from home, ambulates independently, no known PMH/PSH, has not seen doctor in years, came with chief complain of cough and shortness of breadth started two months ago and got worsened two days ago. Admitted for AHRF in the setting of viral/bacterial pneumonia vs PE

## 2023-07-16 NOTE — ED ADULT NURSE NOTE - NSFALLUNIVINTERV_ED_ALL_ED
Bed/Stretcher in lowest position, wheels locked, appropriate side rails in place/Call bell, personal items and telephone in reach/Instruct patient to call for assistance before getting out of bed/chair/stretcher/Non-slip footwear applied when patient is off stretcher/[place] to call system/Physically safe environment - no spills, clutter or unnecessary equipment/Purposeful proactive rounding/Room/bathroom lighting operational, light cord in reach

## 2023-07-16 NOTE — ED PROVIDER NOTE - IV ALTEPLASE EXCL REL HIDDEN
"SUBJECTIVE:  This is a 32year old female patient presents for 2 month follow up regarding change in [facility] to POP due to migraines with aura. Pt has noticed a decrease in migraines from 2-3 every 2 weeks to 1 in the past 8 weeks. Doing okay with POP no menses, taking daily. Pt has long standing sexual dysfunction, unable to have coitus. Reviewed anatomy last visit, pt has been working on intimacy with . Looked at some books on line and thinks she is going to order the [employer]. Has not pursued genital finger or tampon use. Declines pap smear or pelvic exam today. Has been able to have finger stimulation from  but this was few years ago. Denies hx of past abuse. Current Outpatient Medications   Medication Sig Dispense Refill   â¢ norethindrone (MICRONOR) 0.35 MG tablet Take 1 tablet by mouth daily. Take one tablet by mouth daily. 3 packet 3   â¢ buPROPion (WELLBUTRIN) 75 MG tablet Take 1 tablet by mouth 2 times daily. Dose decreased 12/22/18 180 tablet 0   â¢ amitriptyline (ELAVIL) 10 MG tablet TAKE TWO TABLETS BY MOUTH DAILY AT BEDTIME 180 tablet 3   â¢ sumatriptan (IMITREX) 100 MG tablet TAKE ONE TABLET BY MOUTH AT ONSET OF MIGRAINE HEADACHE. MAXIMUM  MG PER DAY 9 tablet 5     No current facility-administered medications for this visit. ALLERGIES:  No Known Allergies    I have reviewed and updated today the pt's past medical hx, surgical hx, Family Hx, Social Hx  in the electronic medical record. OBJECTIVE:  Visit Vitals  /68 [place] [facility] [place]: Sitting, Cuff Size: Regular)   Ht 5' 11"" (1.803 m)   Wt 83.6 kg   LMP 12/16/2018 (Exact Date)   BMI 25.72 kg/mÂ²       Time spent in consultation over 50% of the visit. Again reviewed genital anatomy, pt is willing to try manual exploration/stimulation and possible tampon use for short time. Pt did feel she would benefit from counseling and was given referral to Renetta Marie, [address] [place] [address].  She feels her thoughts and need " for control are preventing her from moving forward.  is very supportive. Pt overall goal is to have a healthy sex life and be able to conceive. Reviewed after the above steps to consider using low dose xanax for few times with penetrative coitus or vaginal valium suppository, possibly female PT. Pt will contact me when she is ready for the next step. Erik Jean-Paul was seen today for follow-up. Diagnoses and all orders for this visit:    Encounter for surveillance of contraceptive pills  -     norethindrone (MICRONOR) 0.35 MG tablet; Take 1 tablet by mouth daily. Take one tablet by mouth daily. Migraine with aura and without status migrainosus, not intractable    Vaginismus          See above note.   Pt to follow up prn and one year show

## 2023-07-16 NOTE — H&P ADULT - NSICDXPASTSURGICALHX_GEN_ALL_CORE_FT
PAST SURGICAL HISTORY:  History of bilateral ligation of fallopian tubes     S/P cholecystectomy

## 2023-07-17 RX ORDER — ACETAMINOPHEN 500 MG
650 TABLET ORAL EVERY 6 HOURS
Refills: 0 | Status: DISCONTINUED | OUTPATIENT
Start: 2023-07-17 | End: 2023-07-19

## 2023-07-17 RX ADMIN — Medication 3 MILLILITER(S): at 08:15

## 2023-07-17 RX ADMIN — Medication 3 MILLILITER(S): at 14:54

## 2023-07-17 RX ADMIN — ENOXAPARIN SODIUM 40 MILLIGRAM(S): 100 INJECTION SUBCUTANEOUS at 06:46

## 2023-07-17 RX ADMIN — CEFTRIAXONE 100 MILLIGRAM(S): 500 INJECTION, POWDER, FOR SOLUTION INTRAMUSCULAR; INTRAVENOUS at 16:22

## 2023-07-17 RX ADMIN — Medication 200 MILLIGRAM(S): at 21:09

## 2023-07-17 RX ADMIN — Medication 3 MILLILITER(S): at 20:32

## 2023-07-17 RX ADMIN — Medication 3 MILLILITER(S): at 02:24

## 2023-07-17 RX ADMIN — AZITHROMYCIN 255 MILLIGRAM(S): 500 TABLET, FILM COATED ORAL at 16:22

## 2023-07-17 NOTE — CONSULT NOTE ADULT - SUBJECTIVE AND OBJECTIVE BOX
Time of visit:    CHIEF COMPLAINT: Patient is a 62y old  Female who presents with a chief complaint of AHRF in the setting of pneumonia vs PE (17 Jul 2023 13:04)      HPI:  63 yo F, from home, ambulates independently, no known PMH/PSH, has not seen doctor in years, came with chief complain of cough and shortness of breadth. Pt stated that she has been coughing for 2 months first it was dry but for 2 days she is bringing up mucus white in color. Pt has had BROOKS for two months which she was able to manage but for two days it is getting worse and she can't even walk few steps without getting short of breath. Pt has been to urgent care multiple time in past two months and was prescribed prednisione and albuterol. As her symptoms got worse in two days she came to [facility] ED this morning and was discharged on advil, azithromycina dn prednisone but pt said as soon as she got home she was so SOB that unable to do any activity which prompted her to come to ED. Pt deneid sick contacts, chest pain +ve only when she ocughs, denied hemoptysis, orthopnea, dyspnea at rest.     ED: s/p IV solumedrol  Xray chest showed Present left shows slight linear atelectasis in left mid lateral lung  CTA showed   (16 Jul 2023 16:41)   Patient seen and examined.     PAST MEDICAL & SURGICAL HISTORY:  S/P cholecystectomy      History of bilateral ligation of fallopian tubes          Allergies    No Known Allergies    Intolerances        MEDICATIONS  (STANDING):  albuterol/ipratropium for Nebulization 3 milliLiter(s) Nebulizer every 6 hours  azithromycin  IVPB      azithromycin  IVPB 500 milliGRAM(s) IV Intermittent every 24 hours  cefTRIAXone   IVPB 1000 milliGRAM(s) IV Intermittent every 24 hours  cefTRIAXone   IVPB      enoxaparin Injectable 40 milliGRAM(s) SubCutaneous every 24 hours      MEDICATIONS  (PRN):  acetaminophen     Tablet .. 650 milliGRAM(s) Oral every 6 hours PRN Temp greater or equal to 38C (100.4F), Moderate Pain (4 - 6)  guaiFENesin Oral Liquid (Sugar-Free) 200 milliGRAM(s) Oral every 6 hours PRN Cough   Medications up to date at time of exam.    Medications up to date at time of exam.    FAMILY HISTORY:  FH: heart disease (Mother)        SOCIAL HISTORY  Smoking History: Denies active smoking. Had smoke for short term when she was in 20s.  Living Condition: [   ] apartment, [   ] private house  Work History:   Travel History: denies recent travel  Illicit Substance Use: denies  Alcohol Use: denies    REVIEW OF SYSTEMS:    CONSTITUTIONAL:  No fevers, chills on exam.     HEENT:  Denies blurred vision. No sore throat nor runny nose.    CARDIOVASCULAR:  Denies chest discomfort. No palpitations.    RESPIRATORY:  Verbalized of  SOB on exertion. Has non productive  cough with clear phlegm .    GASTROINTESTINAL:  Denies abdominal pain. No vomiting nor diarrhea on exam .    GENITOURINARY: Denies dysuria.    NEUROLOGIC:  No tremors, nor seizures on exam.     PSYCHIATRIC:  No emotional distress on exam.     PHYSICAL EXAMINATION:    GENERAL: Alert and oriented. Morbid Obese. No acute  distress.     Vital Signs Last 24 Hrs  T(C): 36.8 (17 Jul 2023 12:15), Max: 37.2 (16 Jul 2023 20:23)  T(F): 98.2 (17 Jul 2023 12:15), Max: 99 (16 Jul 2023 20:23)  HR: 64 (17 Jul 2023 12:15) (62 - 89)  BP: 114/76 (17 Jul 2023 12:15) (97/55 - 119/75)  BP(mean): --  RR: 18 (17 Jul 2023 12:15) (18 - 21)  SpO2: 99% (17 Jul 2023 12:15) (94% - 99%)    Parameters below as of 17 Jul 2023 12:15  Patient On (Oxygen Delivery Method): nasal cannula  O2 Flow (L/min): 3         HEENT: Head is normocephalic and atraumatic. No nasal tenderness. Extraocular muscles are intact. Mucous membranes are moist.     NECK: Supple, no palpable adenopathy.    LUNGS: Non labored. No wheezing. No use of accessory muscle.     HEART: S1 S2 Regular rate and no click / rub.     ABDOMEN: Soft, nontender, and nondistended.  Active bowel sounds.     EXTREMITIES: Without any cyanosis, clubbing, rash, lesions .    NEUROLOGIC: Awake, alert, oriented.     SKIN: Warm and moist . Non diaphoretic.       LABS:                        12.8   10.34 )-----------( 285      ( 16 Jul 2023 13:00 )             40.5     07-16    142  |  111<H>  |  14  ----------------------------<  113<H>  4.1   |  23  |  0.54    Ca    8.6      16 Jul 2023 13:00    TPro  8.0  /  Alb  3.4<L>  /  TBili  0.4  /  DBili  x   /  AST  9<L>  /  ALT  22  /  AlkPhos  77  07-16    PT/INR - ( 16 Jul 2023 13:00 )   PT: 12.0 sec;   INR: 1.01 ratio         PTT - ( 16 Jul 2023 13:00 )  PTT:27.2 sec  Urinalysis Basic - ( 16 Jul 2023 13:00 )    Color: x / Appearance: x / SG: x / pH: x  Gluc: 113 mg/dL / Ketone: x  / Bili: x / Urobili: x   Blood: x / Protein: x / Nitrite: x   Leuk Esterase: x / RBC: x / WBC x   Sq Epi: x / Non Sq Epi: x / Bacteria: x                      MICROBIOLOGY: (if applicable)    RADIOLOGY & ADDITIONAL STUDIES:  EKG:   CXR:  ECHO:    IMPRESSION: 62y Female PAST MEDICAL & SURGICAL HISTORY:  S/P cholecystectomy      History of bilateral ligation of fallopian tubes    Impression: This is a 63 Y/O Female presented to ED with 2 Months of coughing with clear sputum on exam   , BROOKS, SOB which is worsening , reported she cannot walk few steps without getting short of breath. Patient  has been to urgent care multiple time in past two months and was prescribed prednisone and albuterol. As her symptoms got worse in two days she came to [facility] ED this morning and was discharged on advil, azithromycin and prednisone but pt said as soon as she got home she was so SOB that unable to do any activity which prompted her to come to ED. Admitted with Acute hypoxic respiratory failure with Positive RSV , Viral Pneumonia. CT Chest with no PE. CXR with slight Left Lung Atelectasis.          Suggestion:  O2 saturation 96% with O2 supplement. Continue Oxygen supplementation 2L NC. Monitor worsening of SOB on exertion, O2 saturation trend.  Isolation : Contact .  Continue Azithromycin 500 mg IVPB Daily. Ceftriaxone 1 Gm IVPB Daily.  Continue Duoneb via nebulization Q 6 Hours.  DVT / GI prophylactic. On Lovenox 40 mg SQ Daily.      Can have incentive spirometer once coughing is resolved.

## 2023-07-17 NOTE — PROGRESS NOTE ADULT - SUBJECTIVE AND OBJECTIVE BOX
HPI:  61 yo F, from home, ambulates independently, no known PMH/PSH, has not seen doctor in years, came with chief complain of cough and shortness of breadth. Pt stated that she has been coughing for 2 months first it was dry but for 2 days she is bringing up mucus white in color. Pt has had BROOKS for two months which she was able to manage but for two days it is getting worse and she can't even walk few steps without getting short of breath. Pt has been to urgent care multiple time in past two months and was prescribed prednisione and albuterol. As her symptoms got worse in two days she came to [facility] ED this morning and was discharged on advil, azithromycina dn prednisone but pt said as soon as she got home she was so SOB that unable to do any activity which prompted her to come to ED. Pt deneid sick contacts, chest pain +ve only when she ocughs, denied hemoptysis, orthopnea, dyspnea at rest.     ED: s/p IV solumedrol  Xray chest showed Present left shows slight linear atelectasis in left mid lateral lung  CTA showed   (16 Jul 2023 16:41)      OVERNIGHT EVENTS:    No new overnight events.  Seen and examined at bedside.     REVIEW OF SYSTEMS:      CONSTITUTIONAL: No fever  EYES: no acute visual disturbances  NECK: No pain or stiffness  RESPIRATORY: No cough; No shortness of breath  CARDIOVASCULAR: No chest pain, no palpitations  GASTROINTESTINAL: No pain. No nausea, vomiting or diarrhea   NEUROLOGICAL: No headache or numbness, no tremors  MUSCULOSKELETAL: No joint pain, no muscle pain  GENITOURINARY: no dysuria, no frequency, no hesitancy  PSYCHIATRY: no depression, no anxiety  ALL OTHER  ROS negative        Vital Signs Last 24 Hrs  T(C): 36.8 (17 Jul 2023 12:15), Max: 37.2 (16 Jul 2023 20:23)  T(F): 98.2 (17 Jul 2023 12:15), Max: 99 (16 Jul 2023 20:23)  HR: 64 (17 Jul 2023 12:15) (62 - 89)  BP: 114/76 (17 Jul 2023 12:15) (97/55 - 119/75)  BP(mean): --  RR: 18 (17 Jul 2023 12:15) (18 - 21)  SpO2: 99% (17 Jul 2023 12:15) (94% - 99%)    Parameters below as of 17 Jul 2023 12:15  Patient On (Oxygen Delivery Method): nasal cannula  O2 Flow (L/min): 3      ________________________________________________  PHYSICAL EXAM:    GENERAL: NAD  HEENT: Normocephalic; conjunctivae and sclerae clear;  NECK : supple, no JVD  CHEST/LUNG: Clear to auscultation; Nonlabored  HEART: S1 S2  regular  ABDOMEN: Soft, Nontender, Nondistended; Bowel sounds present  EXTREMITIES: no cyanosis; no LE edema; no calf tenderness  NERVOUS SYSTEM:  Alert; no new deficits  SKIN: warm and dry; No new rashes or lesions    _________________________________________________  CURRENT MEDICATIONS:    MEDICATIONS  (STANDING):  albuterol/ipratropium for Nebulization 3 milliLiter(s) Nebulizer every 6 hours  azithromycin  IVPB      azithromycin  IVPB 500 milliGRAM(s) IV Intermittent every 24 hours  cefTRIAXone   IVPB      cefTRIAXone   IVPB 1000 milliGRAM(s) IV Intermittent every 24 hours  enoxaparin Injectable 40 milliGRAM(s) SubCutaneous every 24 hours    MEDICATIONS  (PRN):  acetaminophen     Tablet .. 650 milliGRAM(s) Oral every 6 hours PRN Temp greater or equal to 38C (100.4F), Moderate Pain (4 - 6)  guaiFENesin Oral Liquid (Sugar-Free) 200 milliGRAM(s) Oral every 6 hours PRN Cough      __________________________________________________  LABS:                          12.8   10.34 )-----------( 285      ( 16 Jul 2023 13:00 )             40.5     07-16    142  |  111<H>  |  14  ----------------------------<  113<H>  4.1   |  23  |  0.54    Ca    8.6      16 Jul 2023 13:00    TPro  8.0  /  Alb  3.4<L>  /  TBili  0.4  /  DBili  x   /  AST  9<L>  /  ALT  22  /  AlkPhos  77  07-16    PT/INR - ( 16 Jul 2023 13:00 )   PT: 12.0 sec;   INR: 1.01 ratio         PTT - ( 16 Jul 2023 13:00 )  PTT:27.2 sec  Urinalysis Basic - ( 16 Jul 2023 13:00 )    Color: x / Appearance: x / SG: x / pH: x  Gluc: 113 mg/dL / Ketone: x  / Bili: x / Urobili: x   Blood: x / Protein: x / Nitrite: x   Leuk Esterase: x / RBC: x / WBC x   Sq Epi: x / Non Sq Epi: x / Bacteria: x      CAPILLARY BLOOD GLUCOSE          __________________________________________________  RADIOLOGY & ADDITIONAL TESTS:    Imaging Personally Reviewed:  YES  < from: CT Angio Chest PE Protocol w/ IV Cont (07.16.23 @ 16:36) >  IMPRESSION:  *  No pulmonary embolism.  *  No acute chest pathology.    < end of copied text >      Consultant(s) Notes Reviewed:   YES     Plan of care was discussed with patient and /or primary care giver; all questions and concerns were addressed and care was aligned with patient's wishes.    Plan discussed with attending and consulting physicians.

## 2023-07-18 LAB
ANION GAP SERPL CALC-SCNC: 8 MMOL/L — SIGNIFICANT CHANGE UP (ref 5–17)
BUN SERPL-MCNC: 21 MG/DL — HIGH (ref 7–18)
CALCIUM SERPL-MCNC: 8.1 MG/DL — LOW (ref 8.4–10.5)
CHLORIDE SERPL-SCNC: 109 MMOL/L — HIGH (ref 96–108)
CO2 SERPL-SCNC: 24 MMOL/L — SIGNIFICANT CHANGE UP (ref 22–31)
CREAT SERPL-MCNC: 0.56 MG/DL — SIGNIFICANT CHANGE UP (ref 0.5–1.3)
EGFR: 102 ML/MIN/1.73M2 — SIGNIFICANT CHANGE UP
GLUCOSE SERPL-MCNC: 86 MG/DL — SIGNIFICANT CHANGE UP (ref 70–99)
HCT VFR BLD CALC: 38.3 % — SIGNIFICANT CHANGE UP (ref 34.5–45)
HGB BLD-MCNC: 11.9 G/DL — SIGNIFICANT CHANGE UP (ref 11.5–15.5)
MAGNESIUM SERPL-MCNC: 2.3 MG/DL — SIGNIFICANT CHANGE UP (ref 1.6–2.6)
MCHC RBC-ENTMCNC: 22.5 PG — LOW (ref 27–34)
MCHC RBC-ENTMCNC: 31.1 GM/DL — LOW (ref 32–36)
MCV RBC AUTO: 72.4 FL — LOW (ref 80–100)
NRBC # BLD: 0 /100 WBCS — SIGNIFICANT CHANGE UP (ref 0–0)
PHOSPHATE SERPL-MCNC: 5.5 MG/DL — HIGH (ref 2.5–4.5)
PLATELET # BLD AUTO: 284 K/UL — SIGNIFICANT CHANGE UP (ref 150–400)
POTASSIUM SERPL-MCNC: 3.7 MMOL/L — SIGNIFICANT CHANGE UP (ref 3.5–5.3)
POTASSIUM SERPL-SCNC: 3.7 MMOL/L — SIGNIFICANT CHANGE UP (ref 3.5–5.3)
RBC # BLD: 5.29 M/UL — HIGH (ref 3.8–5.2)
RBC # FLD: 15.6 % — HIGH (ref 10.3–14.5)
SODIUM SERPL-SCNC: 141 MMOL/L — SIGNIFICANT CHANGE UP (ref 135–145)
WBC # BLD: 8.9 K/UL — SIGNIFICANT CHANGE UP (ref 3.8–10.5)
WBC # FLD AUTO: 8.9 K/UL — SIGNIFICANT CHANGE UP (ref 3.8–10.5)

## 2023-07-18 RX ADMIN — CEFTRIAXONE 100 MILLIGRAM(S): 500 INJECTION, POWDER, FOR SOLUTION INTRAMUSCULAR; INTRAVENOUS at 17:47

## 2023-07-18 RX ADMIN — Medication 3 MILLILITER(S): at 03:41

## 2023-07-18 RX ADMIN — ENOXAPARIN SODIUM 40 MILLIGRAM(S): 100 INJECTION SUBCUTANEOUS at 05:57

## 2023-07-18 RX ADMIN — Medication 3 MILLILITER(S): at 09:13

## 2023-07-18 RX ADMIN — Medication 200 MILLIGRAM(S): at 11:55

## 2023-07-18 RX ADMIN — Medication 3 MILLILITER(S): at 15:25

## 2023-07-18 RX ADMIN — Medication 3 MILLILITER(S): at 20:18

## 2023-07-18 RX ADMIN — Medication 200 MILLIGRAM(S): at 20:25

## 2023-07-18 RX ADMIN — AZITHROMYCIN 255 MILLIGRAM(S): 500 TABLET, FILM COATED ORAL at 17:48

## 2023-07-18 RX ADMIN — Medication 200 MILLIGRAM(S): at 05:57

## 2023-07-18 NOTE — PROGRESS NOTE ADULT - SUBJECTIVE AND OBJECTIVE BOX
Time of Visit:  Patient seen and examined.     MEDICATIONS  (STANDING):  albuterol/ipratropium for Nebulization 3 milliLiter(s) Nebulizer every 6 hours  azithromycin  IVPB 500 milliGRAM(s) IV Intermittent every 24 hours  azithromycin  IVPB      cefTRIAXone   IVPB 1000 milliGRAM(s) IV Intermittent every 24 hours  cefTRIAXone   IVPB      enoxaparin Injectable 40 milliGRAM(s) SubCutaneous every 24 hours      MEDICATIONS  (PRN):  acetaminophen     Tablet .. 650 milliGRAM(s) Oral every 6 hours PRN Temp greater or equal to 38C (100.4F), Moderate Pain (4 - 6)  guaiFENesin Oral Liquid (Sugar-Free) 200 milliGRAM(s) Oral every 6 hours PRN Cough       Medications up to date at time of exam.      PHYSICAL EXAMINATION:  Patient has no new complaints.  GENERAL: The patient is a well-developed, well-nourished, in no apparent distress.     Vital Signs Last 24 Hrs  T(C): 36.2 (18 Jul 2023 05:25), Max: 36.8 (17 Jul 2023 12:15)  T(F): 97.2 (18 Jul 2023 05:25), Max: 98.2 (17 Jul 2023 12:15)  HR: 71 (18 Jul 2023 05:25) (64 - 74)  BP: 112/70 (18 Jul 2023 05:25) (112/70 - 122/48)  BP(mean): --  RR: 20 (18 Jul 2023 05:25) (18 - 20)  SpO2: 98% (18 Jul 2023 05:25) (97% - 99%)    Parameters below as of 18 Jul 2023 05:25  Patient On (Oxygen Delivery Method): nasal cannula  O2 Flow (L/min): 2     (if applicable)    Chest Tube (if applicable)    HEENT: Head is normocephalic and atraumatic. Extraocular muscles are intact. Mucous membranes are moist.     NECK: Supple, no palpable adenopathy.    LUNGS: Clear to auscultation, no wheezing, rales, or rhonchi.    HEART: Regular rate and rhythm without murmur.    ABDOMEN: Soft, nontender, and nondistended.  No hepatosplenomegaly is noted.    : No painful voiding, no pelvic pain    EXTREMITIES: Without any cyanosis, clubbing, rash, lesions or edema.    NEUROLOGIC: Awake, alert, oriented, grossly intact    SKIN: Warm, dry, good turgor.      LABS:                        11.9   8.90  )-----------( 284      ( 18 Jul 2023 05:53 )             38.3     07-18    141  |  109<H>  |  21<H>  ----------------------------<  86  3.7   |  24  |  0.56    Ca    8.1<L>      18 Jul 2023 05:53  Phos  5.5     07-18  Mg     2.3     07-18    TPro  8.0  /  Alb  3.4<L>  /  TBili  0.4  /  DBili  x   /  AST  9<L>  /  ALT  22  /  AlkPhos  77  07-16    PT/INR - ( 16 Jul 2023 13:00 )   PT: 12.0 sec;   INR: 1.01 ratio         PTT - ( 16 Jul 2023 13:00 )  PTT:27.2 sec  Urinalysis Basic - ( 18 Jul 2023 05:53 )    Color: x / Appearance: x / SG: x / pH: x  Gluc: 86 mg/dL / Ketone: x  / Bili: x / Urobili: x   Blood: x / Protein: x / Nitrite: x   Leuk Esterase: x / RBC: x / WBC x   Sq Epi: x / Non Sq Epi: x / Bacteria: x                      MICROBIOLOGY: (if applicable)    RADIOLOGY & ADDITIONAL STUDIES:  EKG:   CXR:  ECHO:    IMPRESSION: 62y Female PAST MEDICAL & SURGICAL HISTORY:  S/P cholecystectomy      History of bilateral ligation of fallopian tubes       p/w           RECOMMENDATIONS:   Time of Visit:  Patient seen and examined. pat sitting chair , reese Aleman at bedside     MEDICATIONS  (STANDING):  albuterol/ipratropium for Nebulization 3 milliLiter(s) Nebulizer every 6 hours  azithromycin  IVPB 500 milliGRAM(s) IV Intermittent every 24 hours  azithromycin  IVPB      cefTRIAXone   IVPB 1000 milliGRAM(s) IV Intermittent every 24 hours  cefTRIAXone   IVPB      enoxaparin Injectable 40 milliGRAM(s) SubCutaneous every 24 hours      MEDICATIONS  (PRN):  acetaminophen     Tablet .. 650 milliGRAM(s) Oral every 6 hours PRN Temp greater or equal to 38C (100.4F), Moderate Pain (4 - 6)  guaiFENesin Oral Liquid (Sugar-Free) 200 milliGRAM(s) Oral every 6 hours PRN Cough       Medications up to date at time of exam.      PHYSICAL EXAMINATION:  Patient has no new complaints.  GENERAL: The patient is a well-developed, well-nourished, in no apparent distress.     Vital Signs Last 24 Hrs  T(C): 36.2 (18 Jul 2023 05:25), Max: 36.8 (17 Jul 2023 12:15)  T(F): 97.2 (18 Jul 2023 05:25), Max: 98.2 (17 Jul 2023 12:15)  HR: 71 (18 Jul 2023 05:25) (64 - 74)  BP: 112/70 (18 Jul 2023 05:25) (112/70 - 122/48)  BP(mean): --  RR: 20 (18 Jul 2023 05:25) (18 - 20)  SpO2: 98% (18 Jul 2023 05:25) (97% - 99%)    Parameters below as of 18 Jul 2023 05:25  Patient On (Oxygen Delivery Method): nasal cannula  O2 Flow (L/min): 2     (if applicable)    Chest Tube (if applicable)    HEENT: Head is normocephalic and atraumatic. Extraocular muscles are intact. Mucous membranes are moist.     NECK: Supple, no palpable adenopathy.    LUNGS: Clear to auscultation, no wheezing, rales, or rhonchi.    HEART: Regular rate and rhythm without murmur.    ABDOMEN: Soft, nontender, and nondistended.  No hepatosplenomegaly is noted.    : No painful voiding, no pelvic pain    EXTREMITIES: Without any cyanosis, clubbing, rash, lesions or edema.    NEUROLOGIC: Awake, alert, oriented, grossly intact    SKIN: Warm, dry, good turgor.      LABS:                        11.9   8.90  )-----------( 284      ( 18 Jul 2023 05:53 )             38.3     07-18    141  |  109<H>  |  21<H>  ----------------------------<  86  3.7   |  24  |  0.56    Ca    8.1<L>      18 Jul 2023 05:53  Phos  5.5     07-18  Mg     2.3     07-18    TPro  8.0  /  Alb  3.4<L>  /  TBili  0.4  /  DBili  x   /  AST  9<L>  /  ALT  22  /  AlkPhos  77  07-16    PT/INR - ( 16 Jul 2023 13:00 )   PT: 12.0 sec;   INR: 1.01 ratio         PTT - ( 16 Jul 2023 13:00 )  PTT:27.2 sec  Urinalysis Basic - ( 18 Jul 2023 05:53 )    Color: x / Appearance: x / SG: x / pH: x  Gluc: 86 mg/dL / Ketone: x  / Bili: x / Urobili: x   Blood: x / Protein: x / Nitrite: x   Leuk Esterase: x / RBC: x / WBC x   Sq Epi: x / Non Sq Epi: x / Bacteria: x                      MICROBIOLOGY: (if applicable)    RADIOLOGY & ADDITIONAL STUDIES:  EKG:   CXR:  ECHO:    IMPRESSION: 62y Female PAST MEDICAL & SURGICAL HISTORY:  S/P cholecystectomy      History of bilateral ligation of fallopian tubes       p/w         Impression: This is a 61 Y/O Female presented to ED with 2 Months of coughing with clear sputum on exam   , BROOKS, SOB which is worsening , reported she cannot walk few steps without getting short of breath. Patient  has been to urgent care multiple time in past two months and was prescribed prednisone and albuterol. As her symptoms got worse in two days she came to [facility] ED this morning and was discharged on advil, azithromycin and prednisone but pt said as soon as she got home she was so SOB that unable to do any activity which prompted her to come to ED. Admitted with Acute hypoxic respiratory failure with Positive RSV , Viral Pneumonia. CT Chest with no PE. CXR with slight Left Lung Atelectasis.          Suggestion:  O2 saturation 96% with O2 supplement. Continue Oxygen supplementation 2L NC. Monitor worsening of SOB on exertion, O2 saturation trend.  Isolation : Contact .  Continue Azithromycin 500 mg IVPB Daily. Ceftriaxone 1 Gm IVPB Daily.  Continue Duoneb via nebulization Q 6 Hours.  DVT / GI prophylactic. On Lovenox 40 mg SQ Daily.      Can have incentive spirometer once coughing is resolved.    Clinically has sleep apnea , will need out pat sleep study     spoke with at and reese Aleman at bedside

## 2023-07-18 NOTE — PROGRESS NOTE ADULT - SUBJECTIVE AND OBJECTIVE BOX
LUIZA JONES  MR# [id number]  62yFemale        Patient is a 62y old  Female who presents with a chief complaint of AHRF in the setting of pneumonia vs PE (18 Jul 2023 10:29)      INTERVAL HPI/OVERNIGHT EVENTS:  Patient seen and examined at bedside. No notations of chest pain, palpitation, SOB, orthopnea, nausea, vomiting or abdominal pain.    ALLERGIES  No Known Allergies      MEDICATIONS  acetaminophen     Tablet .. 650 milliGRAM(s) Oral every 6 hours PRN Temp greater or equal to 38C (100.4F), Moderate Pain (4 - 6)  albuterol/ipratropium for Nebulization 3 milliLiter(s) Nebulizer every 6 hours  azithromycin  IVPB 500 milliGRAM(s) IV Intermittent every 24 hours  azithromycin  IVPB      cefTRIAXone   IVPB      cefTRIAXone   IVPB 1000 milliGRAM(s) IV Intermittent every 24 hours  enoxaparin Injectable 40 milliGRAM(s) SubCutaneous every 24 hours  guaiFENesin Oral Liquid (Sugar-Free) 200 milliGRAM(s) Oral every 6 hours PRN Cough              REVIEW OF SYSTEMS:  CONSTITUTIONAL: No fever, weight loss, or fatigue  EYES: No eye pain, visual disturbances, or discharge  ENT:  No difficulty hearing, tinnitus, vertigo; No sinus or throat pain  NECK: No pain or stiffness  RESPIRATORY: No cough, wheezing, chills or hemoptysis; No Shortness of Breath  CARDIOVASCULAR: No chest pain, palpitations, passing out, dizziness, or leg swelling  GASTROINTESTINAL: No abdominal or epigastric pain. No nausea, vomiting, or hematemesis; No diarrhea or constipation. No melena or hematochezia.  GENITOURINARY: No dysuria, frequency, hematuria, or incontinence  NEUROLOGICAL: No headaches, memory loss, loss of strength, numbness, or tremors  SKIN: No itching, burning, rashes, or lesions   LYMPH Nodes: No enlarged glands  ENDOCRINE: No heat or cold intolerance; No hair loss  MUSCULOSKELETAL: No joint pain or swelling; No muscle, back, or extremity pain  PSYCHIATRIC: No depression, anxiety, mood swings, or difficulty sleeping  HEME/LYMPH: No easy bruising, or bleeding gums  ALLERGY AND IMMUNOLOGIC: No hives or eczema	    [ ] All others negative	  [ ] Unable to obtain      T(C): 36.2 (07-18-23 @ 05:25), Max: 36.8 (07-17-23 @ 12:15)  T(F): 97.2 (07-18-23 @ 05:25), Max: 98.2 (07-17-23 @ 12:15)  HR: 71 (07-18-23 @ 05:25) (64 - 74)  BP: 112/70 (07-18-23 @ 05:25) (112/70 - 122/48)  RR: 20 (07-18-23 @ 05:25) (18 - 20)  SpO2: 98% (07-18-23 @ 05:25) (97% - 99%)  Wt(kg): --    I&O's Summary        PHYSICAL EXAM:  A X O x  HEAD:  Atraumatic, Normocephalic  EYES: EOMI, PERRLA, conjunctiva and sclera clear  NECK: Supple, No JVD, Normal thyroid  Resp: CTAB, No crackles, wheezing,   CVS: Regular rate and rhythm; No discernable murmurs, rubs, or gallops  ABD: Soft, Nontender, Nondistended; Bowel sounds present  EXTREMITIES:  2+ Peripheral Pulses, No edema  LYMPH: No dicernable lymphadenopathy noted  GENERAL: NAD, well-groomed, well-developed      LABS:                        11.9   8.90  )-----------( 284      ( 18 Jul 2023 05:53 )             38.3     07-18    141  |  109<H>  |  21<H>  ----------------------------<  86  3.7   |  24  |  0.56    Ca    8.1<L>      18 Jul 2023 05:53  Phos  5.5     07-18  Mg     2.3     07-18    TPro  8.0  /  Alb  3.4<L>  /  TBili  0.4  /  DBili  x   /  AST  9<L>  /  ALT  22  /  AlkPhos  77  07-16    PT/INR - ( 16 Jul 2023 13:00 )   PT: 12.0 sec;   INR: 1.01 ratio         PTT - ( 16 Jul 2023 13:00 )  PTT:27.2 sec  Urinalysis Basic - ( 18 Jul 2023 05:53 )    Color: x / Appearance: x / SG: x / pH: x  Gluc: 86 mg/dL / Ketone: x  / Bili: x / Urobili: x   Blood: x / Protein: x / Nitrite: x   Leuk Esterase: x / RBC: x / WBC x   Sq Epi: x / Non Sq Epi: x / Bacteria: x      CAPILLARY BLOOD GLUCOSE          Troponins:  ProBNP:  Lipid Profile:   HgA1c:  TSH:           RADIOLOGY & ADDITIONAL TESTS:    Imaging Personally Reviewed:  [ ] YES  [ ] NO      Consultant(s) Notes Reviewed:  [x ] YES  [ ] NO    Care Discussed with Consultants/Other Providers [ x] YES  [ ] NO          PAST MEDICAL & SURGICAL HISTORY:  S/P cholecystectomy      History of bilateral ligation of fallopian tubes            Other form of dyspnea    FH: heart disease (Mother)    Handoff    MEWS Score    Asthma    BROOKS (dyspnea on exertion)    Acute respiratory failure with hypoxia    Elevated blood sugar    Prophylactic measure    Morbid obesity    Suspected pulmonary embolism    S/P cholecystectomy    History of bilateral ligation of fallopian tubes    W/ SOB    0    Acute respiratory failure with hypoxia    SysAdmin_VisitLink

## 2023-07-18 NOTE — PROGRESS NOTE ADULT - SUBJECTIVE AND OBJECTIVE BOX
Patient is a 62y old  Female who presents with a chief complaint of AHRF in the setting of pneumonia vs PE (18 Jul 2023 11:39)      INTERVAL HPI/OVERNIGHT EVENTS: no overnight events    I&O's Summary    Vital Signs Last 24 Hrs  T(C): 36.8 (18 Jul 2023 13:28), Max: 36.8 (18 Jul 2023 13:28)  T(F): 98.2 (18 Jul 2023 13:28), Max: 98.2 (18 Jul 2023 13:28)  HR: 75 (18 Jul 2023 13:28) (71 - 75)  BP: 124/77 (18 Jul 2023 13:28) (112/70 - 124/77)  BP(mean): --  RR: 17 (18 Jul 2023 13:28) (17 - 20)  SpO2: 96% (18 Jul 2023 13:28) (96% - 98%)    Parameters below as of 18 Jul 2023 13:28  Patient On (Oxygen Delivery Method): room air      PAST MEDICAL & SURGICAL HISTORY:  S/P cholecystectomy      History of bilateral ligation of fallopian tubes          SOCIAL HISTORY  Alcohol:  Tobacco:  Illicit substance use:      FAMILY HISTORY:      LABS:                        11.9   8.90  )-----------( 284      ( 18 Jul 2023 05:53 )             38.3     07-18    141  |  109<H>  |  21<H>  ----------------------------<  86  3.7   |  24  |  0.56    Ca    8.1<L>      18 Jul 2023 05:53  Phos  5.5     07-18  Mg     2.3     07-18        Urinalysis Basic - ( 18 Jul 2023 05:53 )    Color: x / Appearance: x / SG: x / pH: x  Gluc: 86 mg/dL / Ketone: x  / Bili: x / Urobili: x   Blood: x / Protein: x / Nitrite: x   Leuk Esterase: x / RBC: x / WBC x   Sq Epi: x / Non Sq Epi: x / Bacteria: x      CAPILLARY BLOOD GLUCOSE            Urinalysis Basic - ( 18 Jul 2023 05:53 )    Color: x / Appearance: x / SG: x / pH: x  Gluc: 86 mg/dL / Ketone: x  / Bili: x / Urobili: x   Blood: x / Protein: x / Nitrite: x   Leuk Esterase: x / RBC: x / WBC x   Sq Epi: x / Non Sq Epi: x / Bacteria: x        MEDICATIONS  (STANDING):  albuterol/ipratropium for Nebulization 3 milliLiter(s) Nebulizer every 6 hours  azithromycin  IVPB 500 milliGRAM(s) IV Intermittent every 24 hours  azithromycin  IVPB      cefTRIAXone   IVPB 1000 milliGRAM(s) IV Intermittent every 24 hours  cefTRIAXone   IVPB      enoxaparin Injectable 40 milliGRAM(s) SubCutaneous every 24 hours    MEDICATIONS  (PRN):  acetaminophen     Tablet .. 650 milliGRAM(s) Oral every 6 hours PRN Temp greater or equal to 38C (100.4F), Moderate Pain (4 - 6)  guaiFENesin Oral Liquid (Sugar-Free) 200 milliGRAM(s) Oral every 6 hours PRN Cough      REVIEW OF SYSTEMS:  CONSTITUTIONAL: No fever  EYES: No eye pain,   ENMT:No sinus or throat pain  NECK: No pain   RESPIRATORY: + cough, No shortness of breath  CARDIOVASCULAR: No chest pain,  GASTROINTESTINAL: No abdominal  pain.  GENITOURINARY: No dysuria  NEUROLOGICAL: No headaches,  SKIN: No  rashes  MUSCULOSKELETAL: No joint pain     RADIOLOGY & ADDITIONAL TESTS:  < from: CT Angio Chest PE Protocol w/ IV Cont (07.16.23 @ 16:36) >    ACC: [id number] EXAM:  CT ANGIO CHEST PULM [facility], [facility]   ORDERED BY: JENELLE ZAMBRANO     PROCEDURE DATE:  07/16/2023          INTERPRETATION:  CLINICAL INFORMATION: Shortness of breath, chest pain    COMPARISON: Same day chest x-ray    CONTRAST/COMPLICATIONS:  IV Contrast: Omnipaque 350  50 cc administered   50 cc discarded  Oral Contrast: NONE  Complications: None reported at time of study completion    PROCEDURE:  CT Angiography of the Chest.  Sagittal and coronal reformats were performed as well as 3D (MIP)   reconstructions.    FINDINGS:    PULMONARY ARTERIES: No pulmonary embolism.    LUNGS AND LARGE AIRWAYS: The central airways are patent. The lungs are   clear. A few scattered subcentimeter pulmonary nodules are nonspecific.  PLEURA: No pleural abnormality.  VESSELS: Normal caliber aorta.  HEART: Normal heart size. No pericardial effusion.  MEDIASTINUM AND ISAAK: No adenopathy.  CHEST WALL AND LOWER NECK: No masses.  VISUALIZED UPPER ABDOMEN: Limited visualization is unremarkable.  BONES: No acute bony abnormality.    IMPRESSION:  *  No pulmonary embolism.  *  No acute chest pathology.      --- End of Report ---        YADI MCCULLOUGH MD; Attending Radiologist  This document has been electronically signed. Jul 16 2023  5:22PM    < end of copied text >    Imaging Personally Reviewed:  [ x] YES  [ ] NO    Consultant(s) Notes Reviewed:  [x ] YES  [ ] NO    PHYSICAL EXAM:  GENERAL: NAD,  HEAD:  Atraumatic, Normocephalic  EYES:  conjunctiva and sclera clear  ENMT:  Moist mucous membranes  NECK: Supple,  NERVOUS SYSTEM:  Alert & Oriented X3, Good concentration  CHEST/LUNG: CTA bilaterally  HEART: Regular rate and rhythm  ABDOMEN: Soft, Nontender, Nondistended; Bowel sounds present  EXTREMITIES:  2+ Peripheral Pulses  SKIN: No rashes     Care Collaborated Discussed with Consultants/Other Providers [ x] YES  [ ] NO

## 2023-07-19 ENCOUNTER — TRANSCRIPTION ENCOUNTER (OUTPATIENT)
Age: 63
End: 2023-07-19

## 2023-07-19 VITALS
RESPIRATION RATE: 18 BRPM | SYSTOLIC BLOOD PRESSURE: 127 MMHG | TEMPERATURE: 98 F | HEART RATE: 72 BPM | DIASTOLIC BLOOD PRESSURE: 67 MMHG | OXYGEN SATURATION: 100 %

## 2023-07-19 DIAGNOSIS — Z02.9 ENCOUNTER FOR ADMINISTRATIVE EXAMINATIONS, UNSPECIFIED: ICD-10-CM

## 2023-07-19 PROCEDURE — 80053 COMPREHEN METABOLIC PANEL: CPT

## 2023-07-19 PROCEDURE — 82803 BLOOD GASES ANY COMBINATION: CPT

## 2023-07-19 PROCEDURE — 85730 THROMBOPLASTIN TIME PARTIAL: CPT

## 2023-07-19 PROCEDURE — 85025 COMPLETE CBC W/AUTO DIFF WBC: CPT

## 2023-07-19 PROCEDURE — 83880 ASSAY OF NATRIURETIC PEPTIDE: CPT

## 2023-07-19 PROCEDURE — 94640 AIRWAY INHALATION TREATMENT: CPT

## 2023-07-19 PROCEDURE — 87040 BLOOD CULTURE FOR BACTERIA: CPT

## 2023-07-19 PROCEDURE — 71046 X-RAY EXAM CHEST 2 VIEWS: CPT

## 2023-07-19 PROCEDURE — 0225U NFCT DS DNA&RNA 21 SARSCOV2: CPT

## 2023-07-19 PROCEDURE — 99285 EMERGENCY DEPT VISIT HI MDM: CPT

## 2023-07-19 PROCEDURE — 96374 THER/PROPH/DIAG INJ IV PUSH: CPT | Mod: XU

## 2023-07-19 PROCEDURE — 36415 COLL VENOUS BLD VENIPUNCTURE: CPT

## 2023-07-19 PROCEDURE — 71275 CT ANGIOGRAPHY CHEST: CPT | Mod: MA

## 2023-07-19 PROCEDURE — 85610 PROTHROMBIN TIME: CPT

## 2023-07-19 PROCEDURE — 84100 ASSAY OF PHOSPHORUS: CPT

## 2023-07-19 PROCEDURE — 80048 BASIC METABOLIC PNL TOTAL CA: CPT

## 2023-07-19 PROCEDURE — 93005 ELECTROCARDIOGRAM TRACING: CPT

## 2023-07-19 PROCEDURE — 85027 COMPLETE CBC AUTOMATED: CPT

## 2023-07-19 PROCEDURE — 84484 ASSAY OF TROPONIN QUANT: CPT

## 2023-07-19 PROCEDURE — 71045 X-RAY EXAM CHEST 1 VIEW: CPT

## 2023-07-19 PROCEDURE — 83735 ASSAY OF MAGNESIUM: CPT

## 2023-07-19 RX ORDER — LEVOFLOXACIN 5 MG/ML
1 INJECTION, SOLUTION INTRAVENOUS
Qty: 5 | Refills: 0
Start: 2023-07-19 | End: 2023-07-23

## 2023-07-19 RX ORDER — LACTOBACILLUS ACIDOPHILUS 100MM CELL
1 CAPSULE ORAL
Qty: 5 | Refills: 0
Start: 2023-07-19 | End: 2023-07-23

## 2023-07-19 RX ADMIN — Medication 200 MILLIGRAM(S): at 17:45

## 2023-07-19 RX ADMIN — Medication 3 MILLILITER(S): at 08:07

## 2023-07-19 RX ADMIN — AZITHROMYCIN 255 MILLIGRAM(S): 500 TABLET, FILM COATED ORAL at 17:42

## 2023-07-19 RX ADMIN — Medication 200 MILLIGRAM(S): at 05:51

## 2023-07-19 RX ADMIN — Medication 3 MILLILITER(S): at 03:32

## 2023-07-19 RX ADMIN — Medication 3 MILLILITER(S): at 14:33

## 2023-07-19 RX ADMIN — Medication 200 MILLIGRAM(S): at 10:36

## 2023-07-19 RX ADMIN — CEFTRIAXONE 100 MILLIGRAM(S): 500 INJECTION, POWDER, FOR SOLUTION INTRAMUSCULAR; INTRAVENOUS at 17:45

## 2023-07-19 RX ADMIN — ENOXAPARIN SODIUM 40 MILLIGRAM(S): 100 INJECTION SUBCUTANEOUS at 05:52

## 2023-07-19 NOTE — DISCHARGE NOTE NURSING/CASE MANAGEMENT/SOCIAL WORK - NSDCPEFALRISK_GEN_ALL_CORE
For information on Fall & Injury Prevention, visit: [email or web address] OR  [email or web address] OR  https://www.cdc.gov/steadi/patient.html

## 2023-07-19 NOTE — PROGRESS NOTE ADULT - SUBJECTIVE AND OBJECTIVE BOX
LUIZA JONES  MR# [id number]  62yFemale        Patient is a 62y old  Female who presents with a chief complaint of AHRF in the setting of pneumonia vs PE (19 Jul 2023 10:29)      INTERVAL HPI/OVERNIGHT EVENTS:  Patient seen and examined at bedside. No notations of chest pain, palpitation, SOB, orthopnea, nausea, vomiting or abdominal pain.    ALLERGIES  No Known Allergies      MEDICATIONS  acetaminophen     Tablet .. 650 milliGRAM(s) Oral every 6 hours PRN Temp greater or equal to 38C (100.4F), Moderate Pain (4 - 6)  albuterol/ipratropium for Nebulization 3 milliLiter(s) Nebulizer every 6 hours  azithromycin  IVPB 500 milliGRAM(s) IV Intermittent every 24 hours  azithromycin  IVPB      cefTRIAXone   IVPB 1000 milliGRAM(s) IV Intermittent every 24 hours  cefTRIAXone   IVPB      enoxaparin Injectable 40 milliGRAM(s) SubCutaneous every 24 hours  guaiFENesin Oral Liquid (Sugar-Free) 200 milliGRAM(s) Oral every 6 hours PRN Cough              REVIEW OF SYSTEMS:  CONSTITUTIONAL: No fever, weight loss, or fatigue  EYES: No eye pain, visual disturbances, or discharge  ENT:  No difficulty hearing, tinnitus, vertigo; No sinus or throat pain  NECK: No pain or stiffness  RESPIRATORY: No cough, wheezing, chills or hemoptysis; No Shortness of Breath  CARDIOVASCULAR: No chest pain, palpitations, passing out, dizziness, or leg swelling  GASTROINTESTINAL: No abdominal or epigastric pain. No nausea, vomiting, or hematemesis; No diarrhea or constipation. No melena or hematochezia.  GENITOURINARY: No dysuria, frequency, hematuria, or incontinence  NEUROLOGICAL: No headaches, memory loss, loss of strength, numbness, or tremors  SKIN: No itching, burning, rashes, or lesions   LYMPH Nodes: No enlarged glands  ENDOCRINE: No heat or cold intolerance; No hair loss  MUSCULOSKELETAL: No joint pain or swelling; No muscle, back, or extremity pain  PSYCHIATRIC: No depression, anxiety, mood swings, or difficulty sleeping  HEME/LYMPH: No easy bruising, or bleeding gums  ALLERGY AND IMMUNOLOGIC: No hives or eczema	    [ ] All others negative	  [ ] Unable to obtain      T(C): 36.6 (07-19-23 @ 11:45), Max: 36.9 (07-19-23 @ 05:24)  T(F): 97.8 (07-19-23 @ 11:45), Max: 98.5 (07-19-23 @ 05:24)  HR: 72 (07-19-23 @ 11:45) (63 - 75)  BP: 127/67 (07-19-23 @ 11:45) (116/61 - 127/67)  RR: 18 (07-19-23 @ 11:45) (17 - 20)  SpO2: 100% (07-19-23 @ 11:45) (95% - 100%)  Wt(kg): --    I&O's Summary        PHYSICAL EXAM:  A X O x  HEAD:  Atraumatic, Normocephalic  EYES: EOMI, PERRLA, conjunctiva and sclera clear  NECK: Supple, No JVD, Normal thyroid  Resp: CTAB, No crackles, wheezing,   CVS: Regular rate and rhythm; No discernable murmurs, rubs, or gallops  ABD: Soft, Nontender, Nondistended; Bowel sounds present  EXTREMITIES:  2+ Peripheral Pulses, No edema  LYMPH: No dicernable lymphadenopathy noted  GENERAL: NAD, well-groomed, well-developed      LABS:                        11.9   8.90  )-----------( 284      ( 18 Jul 2023 05:53 )             38.3     07-18    141  |  109<H>  |  21<H>  ----------------------------<  86  3.7   |  24  |  0.56    Ca    8.1<L>      18 Jul 2023 05:53  Phos  5.5     07-18  Mg     2.3     07-18        Urinalysis Basic - ( 18 Jul 2023 05:53 )    Color: x / Appearance: x / SG: x / pH: x  Gluc: 86 mg/dL / Ketone: x  / Bili: x / Urobili: x   Blood: x / Protein: x / Nitrite: x   Leuk Esterase: x / RBC: x / WBC x   Sq Epi: x / Non Sq Epi: x / Bacteria: x      CAPILLARY BLOOD GLUCOSE          Troponins:  ProBNP:  Lipid Profile:   HgA1c:  TSH:           RADIOLOGY & ADDITIONAL TESTS:    Imaging Personally Reviewed:  [ ] YES  [ ] NO      Consultant(s) Notes Reviewed:  [x ] YES  [ ] NO    Care Discussed with Consultants/Other Providers [ x] YES  [ ] NO          PAST MEDICAL & SURGICAL HISTORY:  S/P cholecystectomy      History of bilateral ligation of fallopian tubes            Other form of dyspnea    FH: heart disease (Mother)    Handoff    MEWS Score    Asthma    BROOKS (dyspnea on exertion)    Acute respiratory failure with hypoxia    Acute respiratory failure with hypoxia    Elevated blood sugar    Prophylactic measure    Morbid obesity    Suspected pulmonary embolism    S/P cholecystectomy    History of bilateral ligation of fallopian tubes    W/ SOB    0    Acute respiratory failure with hypoxia    Morbid obesity    Respiratory syncytial virus (RSV)    SysAdmin_VisitLink

## 2023-07-19 NOTE — DISCHARGE NOTE PROVIDER - PROVIDER TOKENS
PROVIDER:[TOKEN:[[id number]]] PROVIDER:[TOKEN:[[id number]]],PROVIDER:[TOKEN:[[id number]],FOLLOWUP:[1 week]]

## 2023-07-19 NOTE — DISCHARGE NOTE NURSING/CASE MANAGEMENT/SOCIAL WORK - PATIENT PORTAL LINK FT
You can access the FollowMyHealth Patient Portal offered by [facility] by registering at the following website: [email or web address]. By joining [facility]’s FollowMyHealth portal, you will also be able to view your health information using other applications (apps) compatible with our system.

## 2023-07-19 NOTE — DISCHARGE NOTE PROVIDER - NSDCCPCAREPLAN_GEN_ALL_CORE_FT
PRINCIPAL DISCHARGE DIAGNOSIS  Diagnosis: Acute respiratory failure with hypoxia  Assessment and Plan of Treatment: You presented with complaints of shortness of breath and cough. you were treated with antibiotics and supplemental oxygen. you were found to have RSV. you had a ct of your chest which did not show any acute pathology. you were followed by a pulmonary doctor who reccommends you have an outpatient sleep study. your condition has improved and you are medically ready for discharge.      SECONDARY DISCHARGE DIAGNOSES  Diagnosis: Respiratory syncytial virus (RSV)  Assessment and Plan of Treatment:     Diagnosis: Morbid obesity  Assessment and Plan of Treatment:      PRINCIPAL DISCHARGE DIAGNOSIS  Diagnosis: Acute respiratory failure with hypoxia  Assessment and Plan of Treatment: You presented with complaints of shortness of breath and cough. you were treated with antibiotics and supplemental oxygen. you were found to have RSV. you had a ct of your chest which did not show any acute pathology. you were followed by a pulmonary doctor who reccommends you have an outpatient sleep study. your condition has improved and you are medically ready for discharge.  Acute respiratory failure (ARF) is a condition that happens when your lungs cannot get enough oxygen into your blood.  Call your local emergency number (911 in the [place]) or have someone call if:  You have more trouble catching your breath.  You have stopped breathing.  Manage ARF:  Do not smoke. Prevent the spread of germs. Wash your hands often with soap and water. Cover your mouth when you cough.   Follow up with your doctor        SECONDARY DISCHARGE DIAGNOSES  Diagnosis: Respiratory syncytial virus (RSV)  Assessment and Plan of Treatment: RSV is a virus that causes infection in your nose, throat, lungs, and airways. RSV infection causes the airways to become swollen and filled with fluid and mucus. This infection may make it hard for you to breathe. A RSV infection may lead to other lung problems, such as pneumonia or bronchiolitis.  Call your local emergency number (911 in the [place]) for any of the following:  You have chest pain or trouble breathing.  Return to the emergency department if:  You have a fever over 102ºF (39ºC).  Manage your symptoms:  Get plenty of rest. Rest can help your body fight the infection.  Drink more liquids than usual. Liquids will help thin and loosen mucus so you can cough it up.   Prevent the spread of germs:  Wash your hands often.  Follow with your primary care doctor on discharge.      Diagnosis: Morbid obesity  Assessment and Plan of Treatment: Obesity means your body mass index (BMI) is greater than 30. Your BMI was 48.5 this admission.   Treatment for obesity focuses on helping you lose weight to improve your health. Even a small decrease in BMI can reduce the risk for many health problems.   Lifestyle changes are the first step in treating obesity. These include making healthy food choices and getting regular physical activity.   Eat smaller portions. Eat 3 meals and 1 or 2 snacks each day. Eat fruits and vegetables at every meal. They are low in calories and high in fiber, which makes you feel full.   Limit the amount of sugar you eat. Do not drink sugary beverages. Limit alcohol.  Physical activity is good for your body in many ways. It helps you burn calories and build strong muscles.  Exercise for at least 30 minutes 5 days a week. Start slowly.  Follow up with your doctor

## 2023-07-19 NOTE — PROGRESS NOTE ADULT - PROBLEM SELECTOR PLAN 1
+ RSV  room air sat 96%  CTA as above  Continue Azithro & CTX (day 3)  Continue with isolation precaution  Continue with PRN O2 support  Continue with supportive care  navarro Dai--> out pat sleep study
+ RSV  room air sat 96%  CTA as above  Continue Azithro & CTX (day 4)  Continue with isolation precaution  Continue with PRN O2 support  Continue with supportive care  navarro Dai--> out pat sleep study
+ RSV  Continue Azithro & CTX (day 2)  Continue with isolation precaution  Continue with PRN O2 support  Continue with supportive care  Follow up lab results
+ RSV  Continue Azithro & CTX (day 2)  Continue with isolation precaution  Continue with PRN O2 support  Continue with supportive care  Follow up lab results
+ RSV  room air sat 96%  CTA as above  Continue Azithro & CTX (day 4)  Continue with isolation precaution  Continue with PRN O2 support  Continue with supportive care  navarro Dai--> out pat sleep study

## 2023-07-19 NOTE — DISCHARGE NOTE PROVIDER - CARE PROVIDERS DIRECT ADDRESSES
,DirectAddress_Unknown ,DirectAddress_Unknown,[email or web address]

## 2023-07-19 NOTE — PROGRESS NOTE ADULT - PROBLEM SELECTOR PLAN 2
Continue DASH diet and Carb consistent diet

## 2023-07-19 NOTE — PROGRESS NOTE ADULT - REASON FOR ADMISSION
AHRF in the setting of pneumonia vs PE

## 2023-07-19 NOTE — PROGRESS NOTE ADULT - PROBLEM SELECTOR PLAN 4
d/c planning for AM  confirm abx duration for d/c  need pulm f/u out pt for sleep study and chest xray in 8 days

## 2023-07-19 NOTE — DISCHARGE NOTE PROVIDER - HOSPITAL COURSE
63 yo F, from home, ambulates independently, no known PMH/PSH, has not seen doctor in years, came with chief complain of cough and shortness of breath started two months ago and got worsened two days ago. Admitted for AHRF in the setting of viral/bacterial pneumonia vs PE       Problem/Plan - 1:  ·  Problem: Acute respiratory failure with hypoxia.   ·  Plan: + RSV  ambulatory room air sat 95%  CTA no PE , no active pulmonary disease  s/p Azithro & CTX   Continue with isolation precaution  Continue with supportive care  Pulm Basilio--> out pat sleep study.     Problem/Plan - 2:  ·  Problem: Morbid obesity.   ·  Plan: Continue DASH diet and Carb consistent diet.     Problem/Plan - 3:  ·  Problem: Prophylactic measure.   ·  Plan: DVT PPX: Lovenox.    Please note that this a brief summary of hospital course please refer to daily progress notes and consult notes for full course and events

## 2023-07-19 NOTE — DISCHARGE NOTE PROVIDER - NSDCHC_MEDRECSTATUS_GEN_ALL_CORE
Admission Reconciliation is Completed  Discharge Reconciliation is Completed I have personally performed a face to face diagnostic evaluation on this patient. I have reviewed the ACP note and agree with the history, exam and plan of care, except as noted.

## 2023-07-19 NOTE — PROGRESS NOTE ADULT - ATTENDING COMMENTS
Pt appearing to have clinical s/s of CAP in addition to parameters for pickwickian syndrome making her breathing a task.  Pt is being closely monitored in case of CPAP support if need be.    7/18/23 - Reporting subjective improvement. OOB to chair w/ PT/OT to follow. Encouraged pt with incentive spirometry.
Pt appearing to have clinical s/s of CAP in addition to parameters for pickwickian syndrome making her breathing a task.  Pt is being closely monitored in case of CPAP support if need be.    7/18/23 - Reporting subjective improvement. OOB to chair w/ PT/OT to follow. Encouraged pt with incentive spirometry.    7/19/23 - D/C planning for home on remaining PO abx in a.m. D/W pt need to f/u closely with pulm for sleep study in eval of sleep apnea. In addition a repeat CXR in 8 days given delayed radiographic improvement.
Pt appearing to have clinical s/s of CAP in addition to parameters for pickwickian syndrome making her breathing a task.  Pt is being closely monitored in case of CPAP support if need be.

## 2023-07-19 NOTE — DISCHARGE NOTE PROVIDER - CARE PROVIDER_API CALL
Krzysztof Azul [facility]  Internal Medicine  [address] [place] [address]  Phone: [phone]  Fax: [phone]  Follow Up Time:    Krzysztof Azul [facility]  Internal Medicine  [address] [place] [address]  Phone: [phone]  Fax: [phone]  Follow Up Time:     Juan Sanchez.  Pulmonary Disease  [address] [facility] [address] [facility] [facility]  [place] [address]  Phone: [phone]  Fax: [phone]  Follow Up Time: 1 week

## 2023-07-19 NOTE — PROGRESS NOTE ADULT - ASSESSMENT
63 yo F, from home, ambulates independently, no known PMH/PSH, has not seen doctor in years, came with chief complain of cough and shortness of breath started two months ago and got worsened two days ago. Admitted for AHRF in the setting of viral/bacterial pneumonia vs PE
61 yo F, from home, ambulates independently, no known PMH/PSH, has not seen doctor in years, came with chief complain of cough and shortness of breadth started two months ago and got worsened two days ago. Admitted for AHRF in the setting of viral/bacterial pneumonia vs PE
61 yo F, from home, ambulates independently, no known PMH/PSH, has not seen doctor in years, came with chief complain of cough and shortness of breadth started two months ago and got worsened two days ago. Admitted for AHRF in the setting of viral/bacterial pneumonia vs PE
63 yo F, from home, ambulates independently, no known PMH/PSH, has not seen doctor in years, came with chief complain of cough and shortness of breath started two months ago and got worsened two days ago. Admitted for AHRF in the setting of viral/bacterial pneumonia vs PE
63 yo F, from home, ambulates independently, no known PMH/PSH, has not seen doctor in years, came with chief complain of cough and shortness of breath started two months ago and got worsened two days ago. Admitted for AHRF in the setting of viral/bacterial pneumonia vs PE

## 2023-07-19 NOTE — DISCHARGE NOTE PROVIDER - NSDCMRMEDTOKEN_GEN_ALL_CORE_FT
predniSONE 20 mg oral tablet: 2 tab(s) orally once a day    lactobacillus acidophilus oral capsule: 1 cap(s) orally once a day  levoFLOXacin 500 mg oral tablet: 1 tab(s) orally once a day  predniSONE 20 mg oral tablet: 2 tab(s) orally once a day

## 2023-07-19 NOTE — PROGRESS NOTE ADULT - SUBJECTIVE AND OBJECTIVE BOX
Patient is a 62y old  Female who presents with a chief complaint of AHRF in the setting of pneumonia vs PE (19 Jul 2023 12:55)      INTERVAL HPI/OVERNIGHT EVENTS: no overnight events    I&O's Summary    Vital Signs Last 24 Hrs  T(C): 36.6 (19 Jul 2023 11:45), Max: 36.9 (19 Jul 2023 05:24)  T(F): 97.8 (19 Jul 2023 11:45), Max: 98.5 (19 Jul 2023 05:24)  HR: 72 (19 Jul 2023 11:45) (63 - 72)  BP: 127/67 (19 Jul 2023 11:45) (116/61 - 127/67)  BP(mean): --  RR: 18 (19 Jul 2023 11:45) (17 - 20)  SpO2: 100% (19 Jul 2023 11:45) (95% - 100%)    Parameters below as of 19 Jul 2023 11:45  Patient On (Oxygen Delivery Method): room air      PAST MEDICAL & SURGICAL HISTORY:  S/P cholecystectomy      History of bilateral ligation of fallopian tubes          SOCIAL HISTORY  Alcohol:  Tobacco:  Illicit substance use:      FAMILY HISTORY:      LABS:                        11.9   8.90  )-----------( 284      ( 18 Jul 2023 05:53 )             38.3     07-18    141  |  109<H>  |  21<H>  ----------------------------<  86  3.7   |  24  |  0.56    Ca    8.1<L>      18 Jul 2023 05:53  Phos  5.5     07-18  Mg     2.3     07-18        Urinalysis Basic - ( 18 Jul 2023 05:53 )    Color: x / Appearance: x / SG: x / pH: x  Gluc: 86 mg/dL / Ketone: x  / Bili: x / Urobili: x   Blood: x / Protein: x / Nitrite: x   Leuk Esterase: x / RBC: x / WBC x   Sq Epi: x / Non Sq Epi: x / Bacteria: x      CAPILLARY BLOOD GLUCOSE            Urinalysis Basic - ( 18 Jul 2023 05:53 )    Color: x / Appearance: x / SG: x / pH: x  Gluc: 86 mg/dL / Ketone: x  / Bili: x / Urobili: x   Blood: x / Protein: x / Nitrite: x   Leuk Esterase: x / RBC: x / WBC x   Sq Epi: x / Non Sq Epi: x / Bacteria: x        MEDICATIONS  (STANDING):  albuterol/ipratropium for Nebulization 3 milliLiter(s) Nebulizer every 6 hours  azithromycin  IVPB 500 milliGRAM(s) IV Intermittent every 24 hours  azithromycin  IVPB      cefTRIAXone   IVPB 1000 milliGRAM(s) IV Intermittent every 24 hours  cefTRIAXone   IVPB      enoxaparin Injectable 40 milliGRAM(s) SubCutaneous every 24 hours    MEDICATIONS  (PRN):  acetaminophen     Tablet .. 650 milliGRAM(s) Oral every 6 hours PRN Temp greater or equal to 38C (100.4F), Moderate Pain (4 - 6)  guaiFENesin Oral Liquid (Sugar-Free) 200 milliGRAM(s) Oral every 6 hours PRN Cough      REVIEW OF SYSTEMS:  CONSTITUTIONAL: No fever  EYES: No eye pain,  ENMT:  ; No sinus or throat pain  NECK: No pain   RESPIRATORY: + cough, no wheezing, chills, No shortness of breath  CARDIOVASCULAR: + chest pain w/ coughing,  GASTROINTESTINAL: No abdominal pain. No nausea, vomiting. No diarrhea or constipation.  GENITOURINARY: No dysuria  NEUROLOGICAL: No headaches,   SKIN: No rashes  MUSCULOSKELETAL: No joint pain     RADIOLOGY & ADDITIONAL TESTS:    < from: Xray Chest 2 Views PA/Lat (07.16.23 @ 12:50) >    ACC: [id number] EXAM:  XR CHEST PA LAT 2V   ORDERED BY: JENELLE ZAMBRANO     PROCEDURE DATE:  07/16/2023          INTERPRETATION:  PA and lateral chest on July 16, 2023 at 12:39 PM.   Patient has chest pain.    Heart size is within normal limits.    Present left shows slight linear atelectasis in left mid lateral lung new   since earlier in the day.    IMPRESSION: Slight linear atelectasis left lung presently seen.    --- End of Report ---    JESSY JESUS MD; Attending Radiologist  This document has been electronically signed. Jul 16 2023 12:53PM    < end of copied text >    ACC: [id number] EXAM:  CT ANGIO CHEST PULM ART WAWI   ORDERED BY: JENELLE ZAMBRANO     PROCEDURE DATE:  07/16/2023          INTERPRETATION:  CLINICAL INFORMATION: Shortness of breath, chest pain    COMPARISON: Same day chest x-ray    CONTRAST/COMPLICATIONS:  IV Contrast: Omnipaque 350  50 cc administered   50 cc discarded  Oral Contrast: NONE  Complications: None reported at time of study completion    PROCEDURE:  CT Angiography of the Chest.  Sagittal and coronal reformats were performed as well as 3D (MIP)   reconstructions.    FINDINGS:    PULMONARY ARTERIES: No pulmonary embolism.    LUNGS AND LARGE AIRWAYS: The central airways are patent. The lungs are   clear. A few scattered subcentimeter pulmonary nodules are nonspecific.  PLEURA: No pleural abnormality.  VESSELS: Normal caliber aorta.  HEART: Normal heart size. No pericardial effusion.  MEDIASTINUM AND ISAAK: No adenopathy.  CHEST WALL AND LOWER NECK: No masses.  VISUALIZED UPPER ABDOMEN: Limited visualization is unremarkable.  BONES: No acute bony abnormality.    IMPRESSION:  *  No pulmonary embolism.  *  No acute chest pathology.      --- End of Report ---    YADIJOSE ALFREDO MCCULLOUGH MD; Attending Radiologist  This document has been electronically signed. Jul 16 2023  5:22PM    Imaging Personally Reviewed:  [x ] YES  [ ] NO    Consultant(s) Notes Reviewed:  [ x] YES  [ ] NO    PHYSICAL EXAM:  GENERAL: NAD  HEAD:  Atraumatic, Normocephalic  EYES: , conjunctiva and sclera clear  ENMT: Moist mucous membranes,  NECK: Supple,   NERVOUS SYSTEM:  Alert & Oriented X3, Good concentration;  CHEST/LUNG: CTA bilaterally; No rales, rhonchi, wheezing,   HEART: Regular rate and rhythm;   ABDOMEN: Soft, Nontender, Nondistended; Bowel sounds present  EXTREMITIES:  2+ Peripheral Pulses  SKIN: No rashes     Care Collaborated Discussed with Consultants/Other Providers [x ] YES  [ ] NO

## 2023-07-21 LAB
CULTURE RESULTS: SIGNIFICANT CHANGE UP
CULTURE RESULTS: SIGNIFICANT CHANGE UP
SPECIMEN SOURCE: SIGNIFICANT CHANGE UP
SPECIMEN SOURCE: SIGNIFICANT CHANGE UP

## 2023-09-15 NOTE — ED ADULT TRIAGE NOTE - STATUS:
PRE-SEDATION ASSESSMENT    CONSENT  Risks, benefits, and alternatives have been discussed with the patient/patient representative, and patient/patient representative agrees to proceed: Yes    MEDICAL HISTORY       PHYSICAL EXAM  History and Physical Reviewed: Patient has valid H&P within 30 days. I have reviewed and there are no changes.  Airway Risk History: No previous complications  Airway Anatomy : Class II  Heart : Normal  Lungs : Normal  LOC/Mental Status : Normal    OTHER FINDINGS  Reviewed current medications and allergies: Yes  Pertinent lab/diagnostic test reviewed: Yes    SEDATION RISK ASSESSMENT  Risk Status ASA: Class II - Normal patient with mild systemic disease  Plan for Sedation: Moderate Sedation  Indications for Procedure/Pre-Procedure Diagnosis and Planned Procedure: L parotid nodule presenting for biopsy  EKG Monitoring: Yes    NARRATIVE FINDINGS      Applied

## 2023-12-06 ENCOUNTER — APPOINTMENT (OUTPATIENT)
Dept: PULMONOLOGY | Facility: CLINIC | Age: 63
End: 2023-12-06

## 2023-12-07 ENCOUNTER — EMERGENCY (EMERGENCY)
Facility: HOSPITAL | Age: 63
LOS: 1 days | Discharge: HOME | End: 2023-12-07
Attending: STUDENT IN AN ORGANIZED HEALTH CARE EDUCATION/TRAINING PROGRAM
Payer: COMMERCIAL

## 2023-12-07 VITALS
DIASTOLIC BLOOD PRESSURE: 81 MMHG | RESPIRATION RATE: 24 BRPM | SYSTOLIC BLOOD PRESSURE: 143 MMHG | HEART RATE: 89 BPM | TEMPERATURE: 98 F | WEIGHT: 235.89 LBS | OXYGEN SATURATION: 96 %

## 2023-12-07 DIAGNOSIS — Z98.51 TUBAL LIGATION STATUS: Chronic | ICD-10-CM

## 2023-12-07 DIAGNOSIS — Z90.49 ACQUIRED ABSENCE OF OTHER SPECIFIED PARTS OF DIGESTIVE TRACT: Chronic | ICD-10-CM

## 2023-12-07 PROCEDURE — 99284 EMERGENCY DEPT VISIT MOD MDM: CPT

## 2023-12-07 PROCEDURE — 71046 X-RAY EXAM CHEST 2 VIEWS: CPT | Mod: 26

## 2023-12-07 PROCEDURE — 99284 EMERGENCY DEPT VISIT MOD MDM: CPT | Mod: 25

## 2023-12-07 PROCEDURE — 94640 AIRWAY INHALATION TREATMENT: CPT

## 2023-12-07 PROCEDURE — 71046 X-RAY EXAM CHEST 2 VIEWS: CPT

## 2023-12-07 RX ORDER — ALBUTEROL 90 UG/1
1 AEROSOL, METERED ORAL ONCE
Refills: 0 | Status: COMPLETED | OUTPATIENT
Start: 2023-12-07 | End: 2023-12-07

## 2023-12-07 RX ORDER — IPRATROPIUM/ALBUTEROL SULFATE 18-103MCG
3 AEROSOL WITH ADAPTER (GRAM) INHALATION
Refills: 0 | Status: DISCONTINUED | OUTPATIENT
Start: 2023-12-07 | End: 2023-12-11

## 2023-12-07 RX ADMIN — Medication 50 MILLIGRAM(S): at 17:44

## 2023-12-07 RX ADMIN — Medication 3 MILLILITER(S): at 18:41

## 2023-12-07 RX ADMIN — ALBUTEROL 1 PUFF(S): 90 AEROSOL, METERED ORAL at 20:28

## 2023-12-07 RX ADMIN — Medication 3 MILLILITER(S): at 17:44

## 2023-12-07 NOTE — ED PROVIDER NOTE - OBJECTIVE STATEMENT
62F, pmh of asthma, presenting with shortness of breath. patient reports yesterday she developed runny nose and thought she had allergies, but today she developed a cough and shortness of breath. no fever, chest pain or trouble breathing.

## 2023-12-07 NOTE — ED PROVIDER NOTE - CLINICAL SUMMARY MEDICAL DECISION MAKING FREE TEXT BOX
62F presenting with cough and sob. diffuse wheezing on exam. concern for asthma exacerbation, viral illness, pna. will give nebulizer treatment, cxr.

## 2023-12-07 NOTE — ED ADULT TRIAGE NOTE - CHIEF COMPLAINT QUOTE
Pt c/o shortness of breath since this AM, used albuterol pump with no relief, 91%RA on scene, 2 combi treatments en route with 97% oxygen saturation  B/L wheezing on auscultation

## 2023-12-07 NOTE — ED PROVIDER NOTE - PHYSICAL EXAMINATION
General: well appearing female, no acute distress   HEENT: normocephalic, atraumatic   Respiratory: increased work of breathing, diffuse wheezing    Cardiac: regular rate and rhythm   MSK: no swelling or tenderness of lower extremities, moving all extremities spontaneously   Skin: warm, dry   Neuro: A&Ox3  Psych: appropriate affect

## 2023-12-07 NOTE — ED PROVIDER NOTE - NSFOLLOWUPINSTRUCTIONS_ED_ALL_ED_FT
You were seen in the emergency department for cough and shortness of breath.     Please follow-up with your primary care doctor in the next 24-48 hours.     Please finish all of your steroids.     If you have any worsening, severe chest pain, trouble breathing, please return to the emergency department.

## 2023-12-07 NOTE — ED PROVIDER NOTE - PATIENT PORTAL LINK FT
You can access the FollowMyHealth Patient Portal offered by [facility] by registering at the following website: [email or web address]. By joining [facility]’s FollowMyHealth portal, you will also be able to view your health information using other applications (apps) compatible with our system. You can access the FollowMyHealth Patient Portal offered by [facility] by registering at the following website: [email or web address]. By joining [facility]’s FollowMyHealth portal, you will also be able to view your health information using other applications (apps) compatible with our system.